# Patient Record
Sex: FEMALE | Race: BLACK OR AFRICAN AMERICAN | Employment: FULL TIME | URBAN - METROPOLITAN AREA
[De-identification: names, ages, dates, MRNs, and addresses within clinical notes are randomized per-mention and may not be internally consistent; named-entity substitution may affect disease eponyms.]

---

## 2021-07-22 ENCOUNTER — HOSPITAL ENCOUNTER (EMERGENCY)
Age: 36
Discharge: HOME | End: 2021-07-22
Attending: EMERGENCY MEDICINE
Payer: COMMERCIAL

## 2021-07-22 ENCOUNTER — APPOINTMENT (OUTPATIENT)
Dept: CT IMAGING | Age: 36
End: 2021-07-22
Payer: COMMERCIAL

## 2021-07-22 ENCOUNTER — APPOINTMENT (OUTPATIENT)
Dept: GENERAL RADIOLOGY | Age: 36
End: 2021-07-22
Payer: COMMERCIAL

## 2021-07-22 VITALS
BODY MASS INDEX: 27.66 KG/M2 | OXYGEN SATURATION: 99 % | RESPIRATION RATE: 18 BRPM | HEART RATE: 68 BPM | TEMPERATURE: 98.1 F | SYSTOLIC BLOOD PRESSURE: 153 MMHG | HEIGHT: 64 IN | DIASTOLIC BLOOD PRESSURE: 97 MMHG | WEIGHT: 162 LBS

## 2021-07-22 DIAGNOSIS — R51.9 ACUTE NONINTRACTABLE HEADACHE, UNSPECIFIED HEADACHE TYPE: ICD-10-CM

## 2021-07-22 DIAGNOSIS — I10 ESSENTIAL HYPERTENSION: Primary | ICD-10-CM

## 2021-07-22 LAB
A/G RATIO: 1.2 (ref 1.1–2.2)
ALBUMIN SERPL-MCNC: 4.1 G/DL (ref 3.4–5)
ALP BLD-CCNC: 65 U/L (ref 40–129)
ALT SERPL-CCNC: 15 U/L (ref 10–40)
ANION GAP SERPL CALCULATED.3IONS-SCNC: 9 MMOL/L (ref 3–16)
AST SERPL-CCNC: 18 U/L (ref 15–37)
BASOPHILS ABSOLUTE: 0.1 K/UL (ref 0–0.2)
BASOPHILS RELATIVE PERCENT: 1.4 %
BILIRUB SERPL-MCNC: <0.2 MG/DL (ref 0–1)
BUN BLDV-MCNC: 10 MG/DL (ref 7–20)
CALCIUM SERPL-MCNC: 9.9 MG/DL (ref 8.3–10.6)
CHLORIDE BLD-SCNC: 99 MMOL/L (ref 99–110)
CO2: 23 MMOL/L (ref 21–32)
CREAT SERPL-MCNC: 0.6 MG/DL (ref 0.6–1.1)
EOSINOPHILS ABSOLUTE: 0.2 K/UL (ref 0–0.6)
EOSINOPHILS RELATIVE PERCENT: 2.7 %
GFR AFRICAN AMERICAN: >60
GFR NON-AFRICAN AMERICAN: >60
GLOBULIN: 3.3 G/DL
GLUCOSE BLD-MCNC: 90 MG/DL (ref 70–99)
HCG QUALITATIVE: NEGATIVE
HCT VFR BLD CALC: 41.6 % (ref 36–48)
HEMOGLOBIN: 13.7 G/DL (ref 12–16)
LYMPHOCYTES ABSOLUTE: 3.3 K/UL (ref 1–5.1)
LYMPHOCYTES RELATIVE PERCENT: 45.8 %
MCH RBC QN AUTO: 27.8 PG (ref 26–34)
MCHC RBC AUTO-ENTMCNC: 32.8 G/DL (ref 31–36)
MCV RBC AUTO: 84.8 FL (ref 80–100)
MONOCYTES ABSOLUTE: 0.5 K/UL (ref 0–1.3)
MONOCYTES RELATIVE PERCENT: 6.2 %
NEUTROPHILS ABSOLUTE: 3.2 K/UL (ref 1.7–7.7)
NEUTROPHILS RELATIVE PERCENT: 43.9 %
PDW BLD-RTO: 13.2 % (ref 12.4–15.4)
PLATELET # BLD: 307 K/UL (ref 135–450)
PMV BLD AUTO: 9 FL (ref 5–10.5)
POTASSIUM REFLEX MAGNESIUM: 4 MMOL/L (ref 3.5–5.1)
RBC # BLD: 4.91 M/UL (ref 4–5.2)
SODIUM BLD-SCNC: 131 MMOL/L (ref 136–145)
TOTAL PROTEIN: 7.4 G/DL (ref 6.4–8.2)
TROPONIN: <0.01 NG/ML
WBC # BLD: 7.3 K/UL (ref 4–11)

## 2021-07-22 PROCEDURE — 93005 ELECTROCARDIOGRAM TRACING: CPT | Performed by: EMERGENCY MEDICINE

## 2021-07-22 PROCEDURE — 85025 COMPLETE CBC W/AUTO DIFF WBC: CPT

## 2021-07-22 PROCEDURE — 99282 EMERGENCY DEPT VISIT SF MDM: CPT

## 2021-07-22 PROCEDURE — 84703 CHORIONIC GONADOTROPIN ASSAY: CPT

## 2021-07-22 PROCEDURE — 71045 X-RAY EXAM CHEST 1 VIEW: CPT

## 2021-07-22 PROCEDURE — 84484 ASSAY OF TROPONIN QUANT: CPT

## 2021-07-22 PROCEDURE — 80053 COMPREHEN METABOLIC PANEL: CPT

## 2021-07-22 PROCEDURE — 70450 CT HEAD/BRAIN W/O DYE: CPT

## 2021-07-22 RX ORDER — ENALAPRIL MALEATE AND HYDROCHLOROTHIAZIDE 10; 25 MG/1; MG/1
1 TABLET ORAL DAILY
Status: ON HOLD | COMMUNITY
End: 2021-08-31 | Stop reason: HOSPADM

## 2021-07-22 ASSESSMENT — PAIN SCALES - GENERAL: PAINLEVEL_OUTOF10: 4

## 2021-07-22 NOTE — ED PROVIDER NOTES
[facility] Emergency Department    CHIEF COMPLAINT  Chief Complaint   Patient presents with    Hypertension     Patient in with complaints of htn and migraine that started this am. States she took her medication and has not had any change. HISTORY OF PRESENT ILLNESS  Andrea Alvarenga is a 38 y.o. female  who presents to the ED complaining of hypertension, history of this, recently restarted on her combination enalapril hydrochlorothiazide medication from her primary care doctor, she notes her blood pressure at home this morning and this afternoon to be in the 150s over 90s which is a little higher than her baseline of the 074X or 955N systolic. She has had some mild lightheadedness and headache. She thought this was typical for her migraine and took Excedrin. She says this is improving although her blood pressure remained high so she comes in for evaluation after being unable to see her PCP. She denies any neck stiffness. No focal numbness tingling or weakness or confusion, no syncope, no chest pain or shortness of breath or any sort of chest discomfort whatsoever. Denies any abdominal pain vomiting or diarrhea. No other complaints, modifying factors or associated symptoms. I have reviewed the following from the nursing documentation. Past Medical History:   Diagnosis Date    Anemia     Taking PO Iron.  GERD (gastroesophageal reflux disease)     Migraines     No meds with pregnancy. History reviewed. No pertinent surgical history.   Family History   Problem Relation Age of Onset    Diabetes Paternal Grandfather      Social History     Socioeconomic History    Marital status: Single     Spouse name: Not on file    Number of children: Not on file    Years of education: Not on file    Highest education level: Not on file   Occupational History    Not on file   Tobacco Use    Smoking status: Never Smoker    Smokeless tobacco: Never Used   Substance and Sexual Activity    Alcohol use: No     Comment: socially/ twice a month    Drug use: No    Sexual activity: Yes     Partners: Male   Other Topics Concern    Not on file   Social History Narrative    Not on file     Social Determinants of Health     Financial Resource Strain:     Difficulty of Paying Living Expenses:    Food Insecurity:     Worried About Running Out of Food in the Last Year:     [address] in the Last Year:    Transportation Needs:     Lack of Transportation (Medical):  Lack of Transportation (Non-Medical):    Physical Activity:     Days of Exercise per Week:     Minutes of Exercise per Session:    Stress:     Feeling of Stress :    Social Connections:     Frequency of Communication with Friends and Family:     Frequency of Social Gatherings with Friends and Family:     Attends Buddhism Services:     Active Member of Clubs or Organizations:     Attends Club or Organization Meetings:     Marital Status:    Intimate Partner Violence:     Fear of Current or Ex-Partner:     Emotionally Abused:     Physically Abused:     Sexually Abused:      No current facility-administered medications for this encounter.      Current Outpatient Medications   Medication Sig Dispense Refill    enalapril-hydroCHLOROthiazide (VASERETIC) 10-25 MG per tablet Take 1 tablet by mouth daily      RaNITidine HCl (ZANTAC PO) Take 1 tablet by mouth daily as needed      vitamin D (CHOLECALCIFEROL) 1000 UNIT TABS tablet Take 1,000 Units by mouth daily      ferrous sulfate 325 (65 FE) MG tablet Take 325 mg by mouth daily (with breakfast)      Prenatal Vit-Fe Fumarate-FA (PRENATAL VITAMIN) 27-0.8 MG TABS Take 1 tablet by mouth daily      ibuprofen (ADVIL;MOTRIN) 800 MG tablet Take 1 tablet by mouth every 6 hours as needed for Pain 120 tablet 3    docusate sodium (COLACE) 100 MG capsule Take 1 capsule by mouth daily as needed for Constipation 60 capsule 1     Allergies   Allergen Reactions    Zofran Hives REVIEW OF SYSTEMS  10 systems reviewed, pertinent positives per HPI otherwise noted to be negative. PHYSICAL EXAM  BP (!) 153/97   Pulse 68   Temp 98.1 °F (36.7 °C) (Oral)   Resp 18   Ht 5' 4\" (1.626 m)   Wt 162 lb (73.5 kg)   SpO2 99%   BMI 27.81 kg/m²    GENERAL APPEARANCE: Awake and alert. Cooperative. No distress. HENT: Normocephalic. Atraumatic. Mucous membranes are moist.  NECK: Supple. EYES: PERRL. EOM's grossly intact. HEART/CHEST: RRR. No murmurs. No chest wall tenderness. LUNGS: Respirations unlabored. CTAB. Good air exchange. Speaking comfortably in full sentences. ABDOMEN: No tenderness. Soft. Non-distended. No masses. No organomegaly. No guarding or rebound. Normal bowel sounds throughout. MUSCULOSKELETAL: No extremity edema. Compartments soft. No deformity. No tenderness in the extremities. All extremities neurovascularly intact. SKIN: Warm and dry. No acute rashes. NEUROLOGICAL: Alert and oriented. CN's 2-12 intact. No gross facial drooping. Strength 5/5, sensation intact. 2 plus DTR's in knees bilaterally. Gait normal.  PSYCHIATRIC: Normal mood and affect. LABS  I have reviewed all labs for this visit.    Results for orders placed or performed during the hospital encounter of 07/22/21   CBC auto differential   Result Value Ref Range    WBC 7.3 4.0 - 11.0 K/uL    RBC 4.91 4.00 - 5.20 M/uL    Hemoglobin 13.7 12.0 - 16.0 g/dL    Hematocrit 41.6 36.0 - 48.0 %    MCV 84.8 80.0 - 100.0 fL    MCH 27.8 26.0 - 34.0 pg    MCHC 32.8 31.0 - 36.0 g/dL    RDW 13.2 12.4 - 15.4 %    Platelets 980 643 - 508 K/uL    MPV 9.0 5.0 - 10.5 fL    Neutrophils % 43.9 %    Lymphocytes % 45.8 %    Monocytes % 6.2 %    Eosinophils % 2.7 %    Basophils % 1.4 %    Neutrophils Absolute 3.2 1.7 - 7.7 K/uL    Lymphocytes Absolute 3.3 1.0 - 5.1 K/uL    Monocytes Absolute 0.5 0.0 - 1.3 K/uL    Eosinophils Absolute 0.2 0.0 - 0.6 K/uL    Basophils Absolute 0.1 0.0 - 0.2 K/uL   Comprehensive Metabolic HISTORY: Reason for exam:->headache HTN Has a \"code stroke\" or \"stroke alert\" been called? ->No Decision Support Exception - unselect if not a suspected or confirmed emergency medical condition->Emergency Medical Condition (MA) Is the patient pregnant?->No Reason for Exam: headache HTN Acuity: Acute Type of Exam: Initial FINDINGS: BRAIN/VENTRICLES: There is no acute intracranial hemorrhage, mass effect or midline shift. No abnormal extra-axial fluid collection. The gray-white differentiation is maintained without evidence of an acute infarct. There is no evidence of hydrocephalus. ORBITS: The visualized portion of the orbits demonstrate no acute abnormality. SINUSES: The visualized paranasal sinuses and mastoid air cells demonstrate no acute abnormality. SOFT TISSUES/SKULL:  No acute abnormality of the visualized skull or soft tissues. No acute intracranial abnormality. XR CHEST PORTABLE    Result Date: 7/22/2021  EXAMINATION: ONE XRAY VIEW OF THE CHEST 7/22/2021 4:57 pm COMPARISON: None. HISTORY: ORDERING SYSTEM PROVIDED HISTORY: HTN TECHNOLOGIST PROVIDED HISTORY: Reason for exam:->HTN Reason for Exam: HTN Acuity: Unknown Type of Exam: Unknown FINDINGS: Heart size is normal.  The lungs are clear. No adenopathy or pleural effusion. No pneumothorax. Mild-to-moderate thoracic scoliosis concave to the left. Normal appearing chest.  No acute abnormality. ED COURSE/MDM  Patient seen and evaluated. Old records reviewed. Labs and imaging reviewed and results discussed with patient. The patient's ED workup was notable for mild headache with hypertension, blood pressures at home or highest at the 587X systolic and she is 156/35 here in triage. She declined analgesics for her headache. I did obtain a head CT considering her history of hypertension with headache and this was negative. She has a nonfocal and reassuring neurologic examination.   She does not have anything to suggest subarachnoid hemorrhage or meningitis. This is a typical migraine type headache for her. She is to continue blood pressure management per primary care discretion. She does not have signs or symptoms of accelerated or malignant hypertension. EKG is normal.  Laboratory assessment is also unremarkable. CLINICAL IMPRESSION  1. Essential hypertension    2. Acute nonintractable headache, unspecified headache type        Blood pressure (!) 153/97, pulse 68, temperature 98.1 °F (36.7 °C), temperature source Oral, resp. rate 18, height 5' 4\" (1.626 m), weight 162 lb (73.5 kg), SpO2 99 %, currently breastfeeding. DISPOSITION  Faisal Haro was discharged to home in stable condition. I have discussed the findings of today's workup with the patient and addressed the patient's questions and concerns. Important warning signs as well as new or worsening symptoms which would necessitate immediate return to the ED were discussed. The plan is to discharge from the ED at this time, and the patient is in stable condition. The patient acknowledged understanding is agreeable with this plan. Follow-up with:  Shawnee Zhou MD  [id number] [address] [place] [address]  [phone]    Schedule an appointment as soon as possible for a visit in 2 days  For symptom re-evaluation    [facility] Emergency Department  [address] [facility]  [phone]  Go to   If symptoms worsen      DISCLAIMER: This chart was created using Dragon dictation software. Efforts were made by me to ensure accuracy, however some errors may be present due to limitations of this technology and occasionally words are not transcribed correctly.         Roshan Singh MD  07/22/21 9867

## 2021-07-23 LAB
EKG ATRIAL RATE: 67 BPM
EKG DIAGNOSIS: NORMAL
EKG P AXIS: 45 DEGREES
EKG P-R INTERVAL: 174 MS
EKG Q-T INTERVAL: 384 MS
EKG QRS DURATION: 74 MS
EKG QTC CALCULATION (BAZETT): 405 MS
EKG R AXIS: 22 DEGREES
EKG T AXIS: 44 DEGREES
EKG VENTRICULAR RATE: 67 BPM

## 2021-07-23 PROCEDURE — 93010 ELECTROCARDIOGRAM REPORT: CPT | Performed by: INTERNAL MEDICINE

## 2021-08-21 LAB — SARS-COV-2: NORMAL

## 2021-08-26 ENCOUNTER — APPOINTMENT (OUTPATIENT)
Dept: GENERAL RADIOLOGY | Age: 36
DRG: 177 | End: 2021-08-26
Payer: COMMERCIAL

## 2021-08-26 ENCOUNTER — HOSPITAL ENCOUNTER (INPATIENT)
Age: 36
LOS: 5 days | Discharge: HOME | DRG: 177 | End: 2021-08-31
Attending: INTERNAL MEDICINE | Admitting: INTERNAL MEDICINE
Payer: COMMERCIAL

## 2021-08-26 DIAGNOSIS — J12.82 PNEUMONIA DUE TO COVID-19 VIRUS: Primary | ICD-10-CM

## 2021-08-26 DIAGNOSIS — J96.01 ACUTE RESPIRATORY FAILURE WITH HYPOXIA (HCC): ICD-10-CM

## 2021-08-26 DIAGNOSIS — U07.1 PNEUMONIA DUE TO COVID-19 VIRUS: Primary | ICD-10-CM

## 2021-08-26 LAB
A/G RATIO: 1.1 (ref 1.1–2.2)
ALBUMIN SERPL-MCNC: 4 G/DL (ref 3.4–5)
ALP BLD-CCNC: 56 U/L (ref 40–129)
ALT SERPL-CCNC: 25 U/L (ref 10–40)
ANION GAP SERPL CALCULATED.3IONS-SCNC: 13 MMOL/L (ref 3–16)
AST SERPL-CCNC: 58 U/L (ref 15–37)
BASOPHILS ABSOLUTE: 0 K/UL (ref 0–0.2)
BASOPHILS RELATIVE PERCENT: 0.6 %
BILIRUB SERPL-MCNC: 0.3 MG/DL (ref 0–1)
BUN BLDV-MCNC: 11 MG/DL (ref 7–20)
CALCIUM SERPL-MCNC: 9.2 MG/DL (ref 8.3–10.6)
CHLORIDE BLD-SCNC: 94 MMOL/L (ref 99–110)
CO2: 25 MMOL/L (ref 21–32)
CREAT SERPL-MCNC: 0.8 MG/DL (ref 0.6–1.1)
D DIMER: 203 NG/ML DDU (ref 0–229)
EOSINOPHILS ABSOLUTE: 0 K/UL (ref 0–0.6)
EOSINOPHILS RELATIVE PERCENT: 0 %
GFR AFRICAN AMERICAN: >60
GFR NON-AFRICAN AMERICAN: >60
GLOBULIN: 3.8 G/DL
GLUCOSE BLD-MCNC: 105 MG/DL (ref 70–99)
HCG QUALITATIVE: NEGATIVE
HCT VFR BLD CALC: 40.6 % (ref 36–48)
HEMOGLOBIN: 13.5 G/DL (ref 12–16)
LACTIC ACID, SEPSIS: 1.1 MMOL/L (ref 0.4–1.9)
LYMPHOCYTES ABSOLUTE: 1.5 K/UL (ref 1–5.1)
LYMPHOCYTES RELATIVE PERCENT: 33.2 %
MCH RBC QN AUTO: 27.1 PG (ref 26–34)
MCHC RBC AUTO-ENTMCNC: 33.2 G/DL (ref 31–36)
MCV RBC AUTO: 81.8 FL (ref 80–100)
MONOCYTES ABSOLUTE: 0.6 K/UL (ref 0–1.3)
MONOCYTES RELATIVE PERCENT: 13.9 %
NEUTROPHILS ABSOLUTE: 2.4 K/UL (ref 1.7–7.7)
NEUTROPHILS RELATIVE PERCENT: 52.3 %
PDW BLD-RTO: 13 % (ref 12.4–15.4)
PLATELET # BLD: 198 K/UL (ref 135–450)
PMV BLD AUTO: 8.9 FL (ref 5–10.5)
POTASSIUM SERPL-SCNC: 3.8 MMOL/L (ref 3.5–5.1)
PRO-BNP: 7 PG/ML (ref 0–124)
PROCALCITONIN: 0.15 NG/ML (ref 0–0.15)
RBC # BLD: 4.97 M/UL (ref 4–5.2)
SODIUM BLD-SCNC: 132 MMOL/L (ref 136–145)
TOTAL PROTEIN: 7.8 G/DL (ref 6.4–8.2)
TROPONIN: <0.01 NG/ML
WBC # BLD: 4.5 K/UL (ref 4–11)

## 2021-08-26 PROCEDURE — 80053 COMPREHEN METABOLIC PANEL: CPT

## 2021-08-26 PROCEDURE — 84703 CHORIONIC GONADOTROPIN ASSAY: CPT

## 2021-08-26 PROCEDURE — 36415 COLL VENOUS BLD VENIPUNCTURE: CPT

## 2021-08-26 PROCEDURE — 99284 EMERGENCY DEPT VISIT MOD MDM: CPT

## 2021-08-26 PROCEDURE — 87040 BLOOD CULTURE FOR BACTERIA: CPT

## 2021-08-26 PROCEDURE — 93005 ELECTROCARDIOGRAM TRACING: CPT | Performed by: NURSE PRACTITIONER

## 2021-08-26 PROCEDURE — 6360000002 HC RX W HCPCS: Performed by: NURSE PRACTITIONER

## 2021-08-26 PROCEDURE — 6370000000 HC RX 637 (ALT 250 FOR IP): Performed by: INTERNAL MEDICINE

## 2021-08-26 PROCEDURE — 85025 COMPLETE CBC W/AUTO DIFF WBC: CPT

## 2021-08-26 PROCEDURE — 71045 X-RAY EXAM CHEST 1 VIEW: CPT

## 2021-08-26 PROCEDURE — 83930 ASSAY OF BLOOD OSMOLALITY: CPT

## 2021-08-26 PROCEDURE — 2580000003 HC RX 258: Performed by: NURSE PRACTITIONER

## 2021-08-26 PROCEDURE — 85379 FIBRIN DEGRADATION QUANT: CPT

## 2021-08-26 PROCEDURE — 83880 ASSAY OF NATRIURETIC PEPTIDE: CPT

## 2021-08-26 PROCEDURE — 83605 ASSAY OF LACTIC ACID: CPT

## 2021-08-26 PROCEDURE — 1200000000 HC SEMI PRIVATE

## 2021-08-26 PROCEDURE — 84145 PROCALCITONIN (PCT): CPT

## 2021-08-26 PROCEDURE — 84484 ASSAY OF TROPONIN QUANT: CPT

## 2021-08-26 RX ORDER — DEXAMETHASONE SODIUM PHOSPHATE 10 MG/ML
6 INJECTION, SOLUTION INTRAMUSCULAR; INTRAVENOUS ONCE
Status: COMPLETED | OUTPATIENT
Start: 2021-08-26 | End: 2021-08-26

## 2021-08-26 RX ORDER — 0.9 % SODIUM CHLORIDE 0.9 %
1000 INTRAVENOUS SOLUTION INTRAVENOUS ONCE
Status: COMPLETED | OUTPATIENT
Start: 2021-08-26 | End: 2021-08-26

## 2021-08-26 RX ORDER — ALBUTEROL SULFATE 90 UG/1
2 AEROSOL, METERED RESPIRATORY (INHALATION) EVERY 6 HOURS PRN
Status: DISCONTINUED | OUTPATIENT
Start: 2021-08-26 | End: 2021-08-31 | Stop reason: HOSPADM

## 2021-08-26 RX ADMIN — DEXAMETHASONE SODIUM PHOSPHATE 6 MG: 10 INJECTION, SOLUTION INTRAMUSCULAR; INTRAVENOUS at 23:22

## 2021-08-26 RX ADMIN — SODIUM CHLORIDE 1000 ML: 9 INJECTION, SOLUTION INTRAVENOUS at 21:24

## 2021-08-26 ASSESSMENT — PAIN SCALES - GENERAL: PAINLEVEL_OUTOF10: 7

## 2021-08-26 ASSESSMENT — ENCOUNTER SYMPTOMS
CHEST TIGHTNESS: 1
VOMITING: 0
NAUSEA: 1
SHORTNESS OF BREATH: 1
DIARRHEA: 0
ABDOMINAL PAIN: 0
COUGH: 1

## 2021-08-26 ASSESSMENT — PAIN DESCRIPTION - LOCATION: LOCATION: GENERALIZED

## 2021-08-26 ASSESSMENT — PAIN DESCRIPTION - PAIN TYPE: TYPE: ACUTE PAIN

## 2021-08-26 NOTE — ED PROVIDER NOTES
are negative. Positives and Pertinent negatives as per HPI. Except as noted above in the ROS, all other systems were reviewed and negative. PAST MEDICAL HISTORY     Past Medical History:   Diagnosis Date    Anemia     Taking PO Iron.  GERD (gastroesophageal reflux disease)     Migraines     No meds with pregnancy. SURGICAL HISTORY   History reviewed. No pertinent surgical history.       CURRENTMEDICATIONS       Previous Medications    DOCUSATE SODIUM (COLACE) 100 MG CAPSULE    Take 1 capsule by mouth daily as needed for Constipation    ENALAPRIL-HYDROCHLOROTHIAZIDE (VASERETIC) 10-25 MG PER TABLET    Take 1 tablet by mouth daily    FERROUS SULFATE 325 (65 FE) MG TABLET    Take 325 mg by mouth daily (with breakfast)    IBUPROFEN (ADVIL;MOTRIN) 800 MG TABLET    Take 1 tablet by mouth every 6 hours as needed for Pain    PRENATAL VIT-FE FUMARATE-FA (PRENATAL VITAMIN) 27-0.8 MG TABS    Take 1 tablet by mouth daily    RANITIDINE HCL (ZANTAC PO)    Take 1 tablet by mouth daily as needed    VITAMIN D (CHOLECALCIFEROL) 1000 UNIT TABS TABLET    Take 1,000 Units by mouth daily         ALLERGIES     Zofran    FAMILYHISTORY       Family History   Problem Relation Age of Onset    Diabetes Paternal Grandfather           SOCIAL HISTORY       Social History     Tobacco Use    Smoking status: Never Smoker    Smokeless tobacco: Never Used   Vaping Use    Vaping Use: Never used   Substance Use Topics    Alcohol use: No     Comment: socially/ twice a month    Drug use: No       SCREENINGS    Beth[place] Coma Scale  Eye Opening: Spontaneous  Best Verbal Response: Oriented  Best Motor Response: Obeys commands  Bethany Coma Scale Score: 15        PHYSICAL EXAM    (up to 7 for level 4, 8 or more for level 5)     ED Triage Vitals [08/26/21 1918]   BP Temp Temp Source Pulse Resp SpO2 Height Weight   115/75 97.5 °F (36.4 °C) Infrared 105 19 99 % 5' 4\" (1.626 m) 153 lb (69.4 kg)       Physical Exam  Vitals and nursing note reviewed. Constitutional:       Appearance: She is well-developed. She is not diaphoretic. HENT:      Head: Normocephalic and atraumatic. Right Ear: External ear normal.      Left Ear: External ear normal.   Eyes:      General:         Right eye: No discharge. Left eye: No discharge. Neck:      Vascular: No JVD. Cardiovascular:      Rate and Rhythm: Tachycardia present. Pulses: Normal pulses. Heart sounds: Normal heart sounds. Pulmonary:      Effort: Pulmonary effort is normal. No respiratory distress. Breath sounds: Rhonchi present. Abdominal:      Palpations: Abdomen is soft. Tenderness: There is no abdominal tenderness. Hernia: No hernia is present. Musculoskeletal:         General: Normal range of motion. Cervical back: Normal range of motion and neck supple. Skin:     General: Skin is warm and dry. Coloration: Skin is not pale. Neurological:      Mental Status: She is alert and oriented to person, place, and time.    Psychiatric:         Behavior: Behavior normal.         DIAGNOSTIC RESULTS   LABS:    Labs Reviewed   COMPREHENSIVE METABOLIC PANEL - Abnormal; Notable for the following components:       Result Value    Sodium 132 (*)     Chloride 94 (*)     Glucose 105 (*)     AST 58 (*)     All other components within normal limits    Narrative:     Performed at:  [facility]  [address] [facility], [address]   Phone [phone]   CULTURE, BLOOD 1   CULTURE, BLOOD 2   CBC WITH AUTO DIFFERENTIAL    Narrative:     Performed at:  [facility]  [address] [facility], [address]   Phone [phone]   LACTATE, SEPSIS    Narrative:     Performed at:  [facility]  [address] [facility], [address]   Phone [phone]   PROCALCITONIN    Narrative:     Performed at:  [facility]  [address] [facility] [address], [place]   Phone [phone]   D-DIMER, QUANTITATIVE    Narrative:     Performed at:  [facility]  [address], [place] [address]   Phone [phone]   TROPONIN    Narrative:     Performed at:  [facility]  [address], [place] [address]   Phone [phone]   BRAIN NATRIURETIC PEPTIDE    Narrative:     Performed at:  [facility]  [address], [place] [address]   Phone [phone]   HCG, SERUM, QUALITATIVE    Narrative:     Performed at:  [facility]  [address], [place]   Phone [phone]   OSMOLALITY   OSMOLALITY, URINE   URINALYSIS   SODIUM, URINE, RANDOM       When ordered only abnormal lab results are displayed. All other labs were within normal range or not returned as of this dictation. EKG: When ordered, EKG's are interpreted by the Emergency Department Physician in the absence of a cardiologist.  Please see their note for interpretation of EKG. RADIOLOGY:   Non-plain film images such as CT, Ultrasound and MRI are read by the radiologist. Plain radiographic images are visualized and preliminarily interpreted by the ED Provider with the below findings:        Interpretation per the Radiologist below, if available at the time of this note:    XR CHEST PORTABLE   Final Result   Patchy infiltrates seen within the mid to lower lungs bilaterally, most   compatible with multifocal pneumonia. Consider both typical and atypical   etiologies, including viral pneumonia. No results found.         PROCEDURES   Unless otherwise noted below, none     Procedures    CRITICAL CARE TIME   N/A    CONSULTS:  IP CONSULT TO HOSPITALIST      EMERGENCY DEPARTMENT COURSE and DIFFERENTIAL DIAGNOSIS/MDM:   Vitals:    Vitals:    08/26/21 2200 08/26/21 2211 08/26/21 2215 08/26/21 2230   BP:   119/80 115/77   Pulse:    96   Resp: Temp:       TempSrc:       SpO2: 99% (!) 88% 94%    Weight:       Height:           Patient was given the following medications:  Medications   albuterol sulfate  (90 Base) MCG/ACT inhaler 2 puff (has no administration in time range)     And   ipratropium (ATROVENT HFA) 17 MCG/ACT inhaler 2 puff (has no administration in time range)   0.9 % sodium chloride bolus (1,000 mLs IntraVENous New Bag 8/26/21 2124)   dexamethasone (PF) (DECADRON) injection 6 mg (6 mg IntraVENous Given 8/26/21 2322)           Briefly, this is a 39year old female that presents to the emergency department with complaint of cough, fever, headache, body aches, chills, and chest pain. States that she is having difficulty breathing. The patient reports that she is COVID-19 positive. CBC is unremarkable. CMP unremarkable. Lactate is normal.  Procalcitonin 0.15. Dimer 203. Troponin negative. BNP 7. Pregnancy negative. XR CHEST PORTABLE (Final result)  Result time 08/26/21 20:39:39  Final result by Davi Green MD (08/26/21 20:39:39)                Impression:    Patchy infiltrates seen within the mid to lower lungs bilaterally, most   compatible with multifocal pneumonia.  Consider both typical and atypical   etiologies, including viral pneumonia. Patient was hypoxic with activity. Patient will be given decadron 6 mg and admitted to hospitalist services for covid pneumonia and acute respiratory failure with hypoxia. FINAL IMPRESSION      1. Pneumonia due to COVID-19 virus    2. Acute respiratory failure with hypoxia [facility])          DISPOSITION/PLAN   DISPOSITION Admitted 08/26/2021 10:54:47 PM      PATIENT REFERRED TO:  No follow-up provider specified.     DISCHARGE MEDICATIONS:  New Prescriptions    No medications on file       DISCONTINUED MEDICATIONS:  Discontinued Medications    No medications on file              (Please note that portions of this note were completed with a voice recognition program.  Efforts were made to edit the dictations but occasionally words are mis-transcribed.)    CHRISTINE Camacho CNP (electronically signed)            CHRISTINE Camacho CNP  08/26/21 3994

## 2021-08-27 LAB
C-REACTIVE PROTEIN: 29 MG/L (ref 0–5.1)
EKG ATRIAL RATE: 106 BPM
EKG DIAGNOSIS: NORMAL
EKG P AXIS: 42 DEGREES
EKG P-R INTERVAL: 156 MS
EKG Q-T INTERVAL: 310 MS
EKG QRS DURATION: 80 MS
EKG QTC CALCULATION (BAZETT): 411 MS
EKG R AXIS: 1 DEGREES
EKG T AXIS: 26 DEGREES
EKG VENTRICULAR RATE: 106 BPM
OSMOLALITY: 279 MOSM/KG (ref 275–295)

## 2021-08-27 PROCEDURE — 6360000002 HC RX W HCPCS: Performed by: INTERNAL MEDICINE

## 2021-08-27 PROCEDURE — 2580000003 HC RX 258: Performed by: NURSE PRACTITIONER

## 2021-08-27 PROCEDURE — XW033E5 INTRODUCTION OF REMDESIVIR ANTI-INFECTIVE INTO PERIPHERAL VEIN, PERCUTANEOUS APPROACH, NEW TECHNOLOGY GROUP 5: ICD-10-PCS | Performed by: INTERNAL MEDICINE

## 2021-08-27 PROCEDURE — 36415 COLL VENOUS BLD VENIPUNCTURE: CPT

## 2021-08-27 PROCEDURE — 2500000003 HC RX 250 WO HCPCS: Performed by: NURSE PRACTITIONER

## 2021-08-27 PROCEDURE — 6370000000 HC RX 637 (ALT 250 FOR IP): Performed by: PHYSICIAN ASSISTANT

## 2021-08-27 PROCEDURE — 86140 C-REACTIVE PROTEIN: CPT

## 2021-08-27 PROCEDURE — 6360000002 HC RX W HCPCS: Performed by: NURSE PRACTITIONER

## 2021-08-27 PROCEDURE — 1200000000 HC SEMI PRIVATE

## 2021-08-27 PROCEDURE — 94761 N-INVAS EAR/PLS OXIMETRY MLT: CPT

## 2021-08-27 PROCEDURE — 2580000003 HC RX 258: Performed by: INTERNAL MEDICINE

## 2021-08-27 PROCEDURE — 93010 ELECTROCARDIOGRAM REPORT: CPT | Performed by: INTERNAL MEDICINE

## 2021-08-27 PROCEDURE — 87040 BLOOD CULTURE FOR BACTERIA: CPT

## 2021-08-27 PROCEDURE — 2700000000 HC OXYGEN THERAPY PER DAY

## 2021-08-27 PROCEDURE — 6370000000 HC RX 637 (ALT 250 FOR IP): Performed by: NURSE PRACTITIONER

## 2021-08-27 PROCEDURE — 94640 AIRWAY INHALATION TREATMENT: CPT

## 2021-08-27 PROCEDURE — 6370000000 HC RX 637 (ALT 250 FOR IP): Performed by: INTERNAL MEDICINE

## 2021-08-27 RX ORDER — PANTOPRAZOLE SODIUM 40 MG/1
40 TABLET, DELAYED RELEASE ORAL
Status: DISCONTINUED | OUTPATIENT
Start: 2021-08-28 | End: 2021-08-27

## 2021-08-27 RX ORDER — FERROUS SULFATE 325(65) MG
325 TABLET ORAL
Status: DISCONTINUED | OUTPATIENT
Start: 2021-08-27 | End: 2021-08-31 | Stop reason: HOSPADM

## 2021-08-27 RX ORDER — DEXAMETHASONE SODIUM PHOSPHATE 10 MG/ML
6 INJECTION, SOLUTION INTRAMUSCULAR; INTRAVENOUS DAILY
Status: DISCONTINUED | OUTPATIENT
Start: 2021-08-27 | End: 2021-08-31

## 2021-08-27 RX ORDER — ONDANSETRON 2 MG/ML
4 INJECTION INTRAMUSCULAR; INTRAVENOUS EVERY 6 HOURS PRN
Status: DISCONTINUED | OUTPATIENT
Start: 2021-08-27 | End: 2021-08-27

## 2021-08-27 RX ORDER — ACETAMINOPHEN 650 MG/1
650 SUPPOSITORY RECTAL EVERY 6 HOURS PRN
Status: DISCONTINUED | OUTPATIENT
Start: 2021-08-27 | End: 2021-08-27 | Stop reason: SDUPTHER

## 2021-08-27 RX ORDER — SODIUM CHLORIDE 9 MG/ML
25 INJECTION, SOLUTION INTRAVENOUS PRN
Status: DISCONTINUED | OUTPATIENT
Start: 2021-08-27 | End: 2021-08-31 | Stop reason: HOSPADM

## 2021-08-27 RX ORDER — VITAMIN B COMPLEX
2000 TABLET ORAL DAILY
Status: DISCONTINUED | OUTPATIENT
Start: 2021-08-27 | End: 2021-08-31 | Stop reason: HOSPADM

## 2021-08-27 RX ORDER — CALCIUM CARBONATE 200(500)MG
500 TABLET,CHEWABLE ORAL 3 TIMES DAILY PRN
Status: DISCONTINUED | OUTPATIENT
Start: 2021-08-27 | End: 2021-08-31 | Stop reason: HOSPADM

## 2021-08-27 RX ORDER — ENALAPRIL MALEATE 5 MG/1
5 TABLET ORAL DAILY
Status: DISCONTINUED | OUTPATIENT
Start: 2021-08-27 | End: 2021-08-31 | Stop reason: HOSPADM

## 2021-08-27 RX ORDER — SODIUM CHLORIDE 0.9 % (FLUSH) 0.9 %
10 SYRINGE (ML) INJECTION EVERY 12 HOURS SCHEDULED
Status: DISCONTINUED | OUTPATIENT
Start: 2021-08-27 | End: 2021-08-31 | Stop reason: HOSPADM

## 2021-08-27 RX ORDER — ACETAMINOPHEN 650 MG/1
650 SUPPOSITORY RECTAL EVERY 6 HOURS PRN
Status: DISCONTINUED | OUTPATIENT
Start: 2021-08-27 | End: 2021-08-31 | Stop reason: HOSPADM

## 2021-08-27 RX ORDER — ACETAMINOPHEN 325 MG/1
650 TABLET ORAL EVERY 6 HOURS PRN
Status: DISCONTINUED | OUTPATIENT
Start: 2021-08-27 | End: 2021-08-27 | Stop reason: SDUPTHER

## 2021-08-27 RX ORDER — SODIUM CHLORIDE 0.9 % (FLUSH) 0.9 %
10 SYRINGE (ML) INJECTION PRN
Status: DISCONTINUED | OUTPATIENT
Start: 2021-08-27 | End: 2021-08-31 | Stop reason: HOSPADM

## 2021-08-27 RX ORDER — PANTOPRAZOLE SODIUM 40 MG/1
40 TABLET, DELAYED RELEASE ORAL
Status: DISCONTINUED | OUTPATIENT
Start: 2021-08-27 | End: 2021-08-31 | Stop reason: HOSPADM

## 2021-08-27 RX ORDER — PROMETHAZINE HYDROCHLORIDE 25 MG/1
12.5 TABLET ORAL EVERY 6 HOURS PRN
Status: DISCONTINUED | OUTPATIENT
Start: 2021-08-27 | End: 2021-08-31 | Stop reason: HOSPADM

## 2021-08-27 RX ORDER — POTASSIUM CHLORIDE 7.45 MG/ML
10 INJECTION INTRAVENOUS PRN
Status: DISCONTINUED | OUTPATIENT
Start: 2021-08-27 | End: 2021-08-31 | Stop reason: HOSPADM

## 2021-08-27 RX ORDER — GUAIFENESIN/DEXTROMETHORPHAN 100-10MG/5
5 SYRUP ORAL EVERY 4 HOURS PRN
Status: DISCONTINUED | OUTPATIENT
Start: 2021-08-27 | End: 2021-08-31 | Stop reason: HOSPADM

## 2021-08-27 RX ORDER — ACETAMINOPHEN 325 MG/1
650 TABLET ORAL EVERY 6 HOURS PRN
Status: DISCONTINUED | OUTPATIENT
Start: 2021-08-27 | End: 2021-08-31 | Stop reason: HOSPADM

## 2021-08-27 RX ORDER — MULTIVITAMIN WITH IRON
1 TABLET ORAL DAILY
Status: DISCONTINUED | OUTPATIENT
Start: 2021-08-27 | End: 2021-08-31 | Stop reason: HOSPADM

## 2021-08-27 RX ORDER — IBUPROFEN 200 MG
200 TABLET ORAL EVERY 6 HOURS PRN
Status: ON HOLD | COMMUNITY
End: 2021-08-31 | Stop reason: HOSPADM

## 2021-08-27 RX ORDER — MAGNESIUM SULFATE IN WATER 40 MG/ML
2000 INJECTION, SOLUTION INTRAVENOUS PRN
Status: DISCONTINUED | OUTPATIENT
Start: 2021-08-27 | End: 2021-08-31 | Stop reason: HOSPADM

## 2021-08-27 RX ADMIN — DEXAMETHASONE SODIUM PHOSPHATE 6 MG: 10 INJECTION, SOLUTION INTRAMUSCULAR; INTRAVENOUS at 11:09

## 2021-08-27 RX ADMIN — ANTACID TABLETS 500 MG: 500 TABLET, CHEWABLE ORAL at 22:02

## 2021-08-27 RX ADMIN — ACETAMINOPHEN 650 MG: 325 TABLET ORAL at 01:52

## 2021-08-27 RX ADMIN — Medication 2000 UNITS: at 08:34

## 2021-08-27 RX ADMIN — ACETAMINOPHEN 650 MG: 325 TABLET ORAL at 23:48

## 2021-08-27 RX ADMIN — FERROUS SULFATE TAB 325 MG (65 MG ELEMENTAL FE) 325 MG: 325 (65 FE) TAB at 11:09

## 2021-08-27 RX ADMIN — Medication 2 PUFF: at 23:26

## 2021-08-27 RX ADMIN — ENALAPRIL MALEATE 5 MG: 5 TABLET ORAL at 11:09

## 2021-08-27 RX ADMIN — REMDESIVIR 200 MG: 100 INJECTION, POWDER, LYOPHILIZED, FOR SOLUTION INTRAVENOUS at 11:15

## 2021-08-27 RX ADMIN — PANTOPRAZOLE SODIUM 40 MG: 40 TABLET, DELAYED RELEASE ORAL at 22:02

## 2021-08-27 RX ADMIN — Medication 10 ML: at 20:54

## 2021-08-27 RX ADMIN — THERA TABS 1 TABLET: TAB at 11:09

## 2021-08-27 RX ADMIN — Medication 10 ML: at 08:34

## 2021-08-27 RX ADMIN — ENOXAPARIN SODIUM 30 MG: 30 INJECTION SUBCUTANEOUS at 20:54

## 2021-08-27 RX ADMIN — ENOXAPARIN SODIUM 30 MG: 30 INJECTION SUBCUTANEOUS at 08:34

## 2021-08-27 ASSESSMENT — PAIN SCALES - GENERAL
PAINLEVEL_OUTOF10: 0
PAINLEVEL_OUTOF10: 0
PAINLEVEL_OUTOF10: 2
PAINLEVEL_OUTOF10: 0

## 2021-08-27 NOTE — ED NOTES
Pt ambulated down hallway with portable pulse ox with O2 sat of 95% throughout on RA. After walking, pt became hypoxic of 88% and placed on 2L NC. Pt tolerated well. Will continue to monitor.       Estee Pope RN  08/26/21 2216       Estee Pope RN  08/26/21 0000       Estee Pope RN  08/26/21 2931

## 2021-08-27 NOTE — CARE COORDINATION
Discharge Planning Note:    Patient admitted as Observation/OPIB with an anticipated short hospitalization length of stay. Chart reviewed and it appears that patient has minimal needs for discharge at this time. Low risk of re-hospitalization (8%). Case management will continue to follow progress and update discharge plan as needed.     Casi Cormier RN BSN  Case Management  [phone]

## 2021-08-27 NOTE — H&P
Hospital Medicine History & Physical      PCP: Valentin Light MD    Date of Admission: 8/26/2021    Date of Service: Pt seen/examined on 8/26/2021  Pt seen/examined face to face on and admitted as inpatient with expected LOS greater than two midnights due to medical therapy    Chief Complaint:    Chief Complaint   Patient presents with    Shortness of Breath     Shortness of breath X2 hours. Covid+, tested last Friday. History Of Present Illness:      39 y.o. female healthy functional independent who presented to [facility] with past medical history of GERD, hypertension, migraine presented to the ED worsening shortness of breath. Patient reported she recently had an exposure from her daughter who had tested positive this week and the patient started having Covid symptoms on Friday. Her daughter is 6years old and only had anosmia, the patient however started having worsening cough and shortness of breath body aches fevers chills and headache. Patient reported that they are both not vaccinated. Exacerbated with exertion, no alleviating factor. No history of strokes heart attacks or blood clots in the family as well. CODE STATUS discussed and the patient is a full code      Past Medical History:          Diagnosis Date    Anemia     Taking PO Iron.  GERD (gastroesophageal reflux disease)     Hypertension     Migraines     No meds with pregnancy. Past Surgical History:          Procedure Laterality Date    COLONOSCOPY         Medications Prior to Admission:      Prior to Admission medications    Medication Sig Start Date End Date Taking?  Authorizing Provider   ibuprofen (ADVIL;MOTRIN) 200 MG tablet Take 200 mg by mouth every 6 hours as needed for Pain   Yes Historical Provider, MD   enalapril-hydroCHLOROthiazide (VASERETIC) 10-25 MG per tablet Take 1 tablet by mouth daily   Yes Historical Provider, MD   ibuprofen (ADVIL;MOTRIN) 800 MG tablet Take 1 tablet by mouth every 6 hours as needed for Pain 8/24/16  Yes Fede Tellez MD   RaNITidine HCl (ZANTAC PO) Take 1 tablet by mouth daily as needed    Historical Provider, MD   vitamin D (CHOLECALCIFEROL) 1000 UNIT TABS tablet Take 1,000 Units by mouth daily    Historical Provider, MD   ferrous sulfate 325 (65 FE) MG tablet Take 325 mg by mouth daily (with breakfast)    Historical Provider, MD   Prenatal Vit-Fe Fumarate-FA (PRENATAL VITAMIN) 27-0.8 MG TABS Take 1 tablet by mouth daily    Historical Provider, MD   docusate sodium (COLACE) 100 MG capsule Take 1 capsule by mouth daily as needed for Constipation 8/24/16   Fede Tellez MD       Allergies:  Zofran    Social History:          TOBACCO:   reports that she has never smoked. She has never used smokeless tobacco.  ETOH:   reports no history of alcohol use. E-Cigarettes/Vaping Use     Questions Responses    E-Cigarette/Vaping Use Never User    Start Date     Passive Exposure     Quit Date     Counseling Given     Comments             Family History:      Reviewed in detail         Problem Relation Age of Onset    Diabetes Paternal Grandfather        REVIEW OF SYSTEMS:     Constitutional: + Fever, N + chills, No Night Sweats  ENT/Mouth:  No Nasal Congestion,  No Hoarseness, No new mouth lesion  Eyes:  No Eye Pain, No Redness, No Discharge  Cardiovascular: + Chest Pain, No Orthopnea, No Palpitations  Respiratory: + Cough, No Sputum, + dyspnea  Gastrointestinal: No Vomiting, No Diarrhea, No abdominal pain  Genitourinary: No Urinary Frequency, No Hematuria, No Urinary pain  Musculoskeletal:  No worsening Arthralgias, No worsening Myalgias  Skin:  No new Skin Lesions, No new skin rash  Neuro:  No new weakness, No new numbness.   Psych:  No suicial ideation, No Violence ideation    PHYSICAL EXAM PERFORMED:    /70   Pulse 92   Temp 100.2 °F (37.9 °C) (Oral)   Resp 18   Ht 5' 4\" (1.626 m)   Wt 156 lb (70.8 kg)   LMP 08/01/2021 (Exact Date)   SpO2 99%   Breastfeeding No ED,  Patient currently on 2 L satting high 98s  Wean as tolerated with home O2 evaluation    Hyponatremia:  Etiology unclear at this time  Urine lites pending  Likely secondary to patient being on hydrochlorothiazide    GERD: Continue home medication  Essential Hypertension: Continue home medication    Diet: NPO except meds ordered    DVT Prophylaxis: lovenox    Dispo:   Expected LOS greater than two Ashley 1153, DO

## 2021-08-27 NOTE — PROGRESS NOTES
[facility] PROGRESS NOTE    8/27/2021 8:45 AM        Name: Silvia Lozada . Admitted: 8/26/2021  Primary Care Provider: Stacy Youssef MD (Tel: [phone])     Chief Complaint   Patient presents with    Shortness of Breath     Shortness of breath X2 hours. Covid+, tested last Friday. Brief History: Patient is a 40 yo female with hx JOAQUÍN, HTN. She was diagnosed with COVID-19 about 1 week ago. Presented to hospital with worsening shortness of breath. CXR with evidence of multifocal pneumonia. Documented O2 sat 88% in ER. She was given 1 dose IV decadron. Subjective:  Presently resting in bed. Main complaint remains shortness of breath and cough although breathing not bad at rest. Denies chest pain, chills, nausea, diarrhea.      Reviewed interval ancillary notes    Current Medications  sodium chloride flush 0.9 % injection 10 mL, 2 times per day  sodium chloride flush 0.9 % injection 10 mL, PRN  0.9 % sodium chloride infusion, PRN  potassium chloride 10 mEq/100 mL IVPB (Peripheral Line), PRN  magnesium sulfate 2000 mg in 50 mL IVPB premix, PRN  promethazine (PHENERGAN) tablet 12.5 mg, Q6H PRN  acetaminophen (TYLENOL) tablet 650 mg, Q6H PRN   Or  acetaminophen (TYLENOL) suppository 650 mg, Q6H PRN  enoxaparin (LOVENOX) injection 30 mg, BID  guaiFENesin-dextromethorphan (ROBITUSSIN DM) 100-10 MG/5ML syrup 5 mL, Q4H PRN  Vitamin D (CHOLECALCIFEROL) tablet 2,000 Units, Daily  lip balm petroleum free (PHYTOPLEX) stick, PRN  albuterol sulfate  (90 Base) MCG/ACT inhaler 2 puff, Q6H PRN   And  ipratropium (ATROVENT HFA) 17 MCG/ACT inhaler 2 puff, Q6H PRN        Objective:  /75   Pulse 75   Temp 97.3 °F (36.3 °C) (Oral)   Resp 16   Ht 5' 4\" (1.626 m)   Wt 156 lb (70.8 kg)   LMP 08/01/2021 (Exact Date)   SpO2 96%   Breastfeeding No   BMI 26.78 kg/m²   No intake or output data in the 24 hours ending 08/27/21 0845   Wt Readings from Last 3 Encounters:   08/27/21 156 lb (70.8 kg)   07/22/21 162 lb (73.5 kg)   04/11/12 153 lb (69.4 kg)       General appearance:  Appears comfortable  Eyes: Sclera clear. Pupils equal.  ENT: Moist oral mucosa. Trachea midline, no adenopathy. Cardiovascular: Regular rhythm, normal S1, S2. No murmur. No edema in lower extremities  Respiratory: Not using accessory muscles. Good inspiratory effort. Diminished, nonproductive cough, no wheezes. GI: Abdomen soft, no tenderness, not distended, normal bowel sounds  Musculoskeletal: No cyanosis in digits, neck supple  Neurology: CN 2-12 grossly intact. No speech or motor deficits  Psych: Normal affect. Alert and oriented in time, place and person  Skin: Warm, dry, normal turgor    Labs and Tests:  CBC:   Recent Labs     08/26/21 1946   WBC 4.5   HGB 13.5        BMP:    Recent Labs     08/26/21 1946   *   K 3.8   CL 94*   CO2 25   BUN 11   CREATININE 0.8   GLUCOSE 105*     Hepatic:   Recent Labs     08/26/21 1946   AST 58*   ALT 25   BILITOT 0.3   ALKPHOS 56       CXR 8/26/2021:  Patchy infiltrates seen within the mid to lower lungs bilaterally, most   compatible with multifocal pneumonia.  Consider both typical and atypical   etiologies, including viral pneumonia. Problem List  Active Problems:    Pneumonia due to COVID-19 virus  Resolved Problems:    * No resolved hospital problems. *       Assessment & Plan:   1. COVID-19 pneumonia. Patient unvaccinated. Received + COVID test 1 week ago. CXR consistent with multifocal pneumonia, procalcitonin 0.15. O2 sat 88% in ED. Received 1 dose IV decadron. Since requiring O2, currently at 2 liters, continue decadron 6 mg IV daily. Also add Remdesivir 200 mg once then 100 mg daily. CRP 29, D-dimer 203, on BID enoxaparin, continue to follow. 2. Acute hypoxic respiratory failure. O2 sat documented at 88% in ED. Secondary to multifocal pneumonia.  Wean O2 as tolerated. 3. Hyponatremia. Sodium 132 on presentation. Likely secondary to HCTZ use. HCTZ held on admission. BMP in am.   4. HTN. Controlled. Resume enalapril. Monitor BP. 5. GERD. Resume Protonix 40 mg daily. Diet: ADULT DIET;  Regular  Code:Full Code  DVT PPX: enoxaparin      CHRISTINE Reynolds CNP   8/27/2021 8:45 AM

## 2021-08-27 NOTE — PROGRESS NOTES
Comprehensive Nutrition Assessment    Type and Reason for Visit:  Positive Nutrition Screen (MST 2)    Nutrition Recommendations/Plan:   1. Offer Ensure Enlive BID  2. Please obtain actual weight     Nutrition Assessment:  Pt is at risk for nutrition compromise as evidenced by inadequate appetite and PO intake over the past week d/t not feeling well with Covid. She did not eat breakfast this morning, but reported she had ordered lunch and was feeling hungry. Agreeable to trial Ensure BID for additional nutrition until appetite fully returns. Malnutrition Assessment:  Malnutrition Status:  Insufficient data      Nutrition Related Findings:  No edema noted      Wounds:  None       Current Nutrition Therapies:    ADULT DIET; Regular    Anthropometric Measures:  · Height: 5' 4\" (162.6 cm)  · Current Body Weight: 156 lb (70.8 kg)   · Ideal Body Weight: 120 lbs; % Ideal Body Weight 130 %   · BMI: 26.8  · BMI Categories: Overweight (BMI 25.0-29. 9) (Based on stated weight)       Nutrition Diagnosis:   · Inadequate protein-energy intake related to inadequate protein-energy intake as evidenced by poor intake prior to admission      Nutrition Interventions:   Food and/or Nutrient Delivery:  Continue Current Diet, Start Oral Nutrition Supplement  Nutrition Education/Counseling:  Education not indicated   Coordination of Nutrition Care:  Continue to monitor while inpatient    Goals:  Pt will consume at least 50% of meals and supplements       Nutrition Monitoring and Evaluation:   Behavioral-Environmental Outcomes:  None Identified   Food/Nutrient Intake Outcomes:  Food and Nutrient Intake, Supplement Intake  Physical Signs/Symptoms Outcomes:  None Identified     Discharge Planning:     Too soon to determine     Electronically signed by Barb Cortes RD, LD on 8/27/21 at 3:16 PM EDT    Contact: [phone]

## 2021-08-28 LAB
A/G RATIO: 1.1 (ref 1.1–2.2)
ALBUMIN SERPL-MCNC: 3.5 G/DL (ref 3.4–5)
ALP BLD-CCNC: 50 U/L (ref 40–129)
ALT SERPL-CCNC: 22 U/L (ref 10–40)
ANION GAP SERPL CALCULATED.3IONS-SCNC: 12 MMOL/L (ref 3–16)
AST SERPL-CCNC: 37 U/L (ref 15–37)
BACTERIA: ABNORMAL /HPF
BASOPHILS ABSOLUTE: 0 K/UL (ref 0–0.2)
BASOPHILS RELATIVE PERCENT: 0.1 %
BILIRUB SERPL-MCNC: 0.3 MG/DL (ref 0–1)
BILIRUBIN URINE: NEGATIVE
BLOOD, URINE: ABNORMAL
BUN BLDV-MCNC: 12 MG/DL (ref 7–20)
C-REACTIVE PROTEIN: 19.6 MG/L (ref 0–5.1)
CALCIUM SERPL-MCNC: 8.7 MG/DL (ref 8.3–10.6)
CHLORIDE BLD-SCNC: 102 MMOL/L (ref 99–110)
CLARITY: ABNORMAL
CO2: 24 MMOL/L (ref 21–32)
COLOR: YELLOW
CREAT SERPL-MCNC: 0.6 MG/DL (ref 0.6–1.1)
D DIMER: <200 NG/ML DDU (ref 0–229)
EOSINOPHILS ABSOLUTE: 0 K/UL (ref 0–0.6)
EOSINOPHILS RELATIVE PERCENT: 0 %
EPITHELIAL CELLS, UA: 9 /HPF (ref 0–5)
GFR AFRICAN AMERICAN: >60
GFR NON-AFRICAN AMERICAN: >60
GLOBULIN: 3.2 G/DL
GLUCOSE BLD-MCNC: 112 MG/DL (ref 70–99)
GLUCOSE URINE: NEGATIVE MG/DL
HCT VFR BLD CALC: 37.2 % (ref 36–48)
HEMOGLOBIN: 12 G/DL (ref 12–16)
HYALINE CASTS: 3 /LPF (ref 0–8)
KETONES, URINE: ABNORMAL MG/DL
LEUKOCYTE ESTERASE, URINE: NEGATIVE
LYMPHOCYTES ABSOLUTE: 1.4 K/UL (ref 1–5.1)
LYMPHOCYTES RELATIVE PERCENT: 19.2 %
MCH RBC QN AUTO: 26.7 PG (ref 26–34)
MCHC RBC AUTO-ENTMCNC: 32.3 G/DL (ref 31–36)
MCV RBC AUTO: 82.8 FL (ref 80–100)
MICROSCOPIC EXAMINATION: YES
MONOCYTES ABSOLUTE: 0.8 K/UL (ref 0–1.3)
MONOCYTES RELATIVE PERCENT: 11.5 %
NEUTROPHILS ABSOLUTE: 5.1 K/UL (ref 1.7–7.7)
NEUTROPHILS RELATIVE PERCENT: 69.2 %
NITRITE, URINE: NEGATIVE
OSMOLALITY URINE: 388 MOSM/KG (ref 390–1070)
PDW BLD-RTO: 13.2 % (ref 12.4–15.4)
PH UA: 7 (ref 5–8)
PLATELET # BLD: 235 K/UL (ref 135–450)
PMV BLD AUTO: 8.2 FL (ref 5–10.5)
POTASSIUM REFLEX MAGNESIUM: 3.8 MMOL/L (ref 3.5–5.1)
PROTEIN UA: ABNORMAL MG/DL
RBC # BLD: 4.49 M/UL (ref 4–5.2)
RBC UA: ABNORMAL /HPF (ref 0–4)
SODIUM BLD-SCNC: 138 MMOL/L (ref 136–145)
SODIUM URINE: <20 MMOL/L
SPECIFIC GRAVITY UA: 1.01 (ref 1–1.03)
TOTAL PROTEIN: 6.7 G/DL (ref 6.4–8.2)
URINE TYPE: ABNORMAL
UROBILINOGEN, URINE: 1 E.U./DL
WBC # BLD: 7.4 K/UL (ref 4–11)
WBC UA: 9 /HPF (ref 0–5)

## 2021-08-28 PROCEDURE — 2580000003 HC RX 258: Performed by: INTERNAL MEDICINE

## 2021-08-28 PROCEDURE — 86140 C-REACTIVE PROTEIN: CPT

## 2021-08-28 PROCEDURE — 94680 O2 UPTK RST&XERS DIR SIMPLE: CPT

## 2021-08-28 PROCEDURE — 6370000000 HC RX 637 (ALT 250 FOR IP): Performed by: NURSE PRACTITIONER

## 2021-08-28 PROCEDURE — 6360000002 HC RX W HCPCS: Performed by: NURSE PRACTITIONER

## 2021-08-28 PROCEDURE — 1200000000 HC SEMI PRIVATE

## 2021-08-28 PROCEDURE — 81003 URINALYSIS AUTO W/O SCOPE: CPT

## 2021-08-28 PROCEDURE — 6370000000 HC RX 637 (ALT 250 FOR IP): Performed by: INTERNAL MEDICINE

## 2021-08-28 PROCEDURE — 80053 COMPREHEN METABOLIC PANEL: CPT

## 2021-08-28 PROCEDURE — 6360000002 HC RX W HCPCS: Performed by: INTERNAL MEDICINE

## 2021-08-28 PROCEDURE — 84300 ASSAY OF URINE SODIUM: CPT

## 2021-08-28 PROCEDURE — 36415 COLL VENOUS BLD VENIPUNCTURE: CPT

## 2021-08-28 PROCEDURE — 85025 COMPLETE CBC W/AUTO DIFF WBC: CPT

## 2021-08-28 PROCEDURE — 2500000003 HC RX 250 WO HCPCS: Performed by: NURSE PRACTITIONER

## 2021-08-28 PROCEDURE — 6370000000 HC RX 637 (ALT 250 FOR IP): Performed by: PHYSICIAN ASSISTANT

## 2021-08-28 PROCEDURE — 2580000003 HC RX 258: Performed by: NURSE PRACTITIONER

## 2021-08-28 PROCEDURE — 85379 FIBRIN DEGRADATION QUANT: CPT

## 2021-08-28 PROCEDURE — 83935 ASSAY OF URINE OSMOLALITY: CPT

## 2021-08-28 RX ADMIN — PANTOPRAZOLE SODIUM 40 MG: 40 TABLET, DELAYED RELEASE ORAL at 09:09

## 2021-08-28 RX ADMIN — THERA TABS 1 TABLET: TAB at 09:09

## 2021-08-28 RX ADMIN — Medication 2000 UNITS: at 09:09

## 2021-08-28 RX ADMIN — FERROUS SULFATE TAB 325 MG (65 MG ELEMENTAL FE) 325 MG: 325 (65 FE) TAB at 09:09

## 2021-08-28 RX ADMIN — DEXAMETHASONE SODIUM PHOSPHATE 6 MG: 10 INJECTION, SOLUTION INTRAMUSCULAR; INTRAVENOUS at 09:09

## 2021-08-28 RX ADMIN — ENOXAPARIN SODIUM 30 MG: 30 INJECTION SUBCUTANEOUS at 09:09

## 2021-08-28 RX ADMIN — Medication 10 ML: at 09:10

## 2021-08-28 RX ADMIN — ENOXAPARIN SODIUM 30 MG: 30 INJECTION SUBCUTANEOUS at 21:24

## 2021-08-28 RX ADMIN — Medication 10 ML: at 21:24

## 2021-08-28 RX ADMIN — REMDESIVIR 100 MG: 100 INJECTION, POWDER, LYOPHILIZED, FOR SOLUTION INTRAVENOUS at 09:59

## 2021-08-28 ASSESSMENT — PAIN SCALES - GENERAL
PAINLEVEL_OUTOF10: 0

## 2021-08-28 NOTE — PROGRESS NOTES
[facility] PROGRESS NOTE    8/28/2021 4:42 PM        Name: Oly Carr . Admitted: 8/26/2021  Primary Care Provider: Gricelda Santana MD (Tel: [phone])     Chief Complaint   Patient presents with    Shortness of Breath     Shortness of breath X2 hours. Covid+, tested last Friday. Brief History: Patient is a 38 yo female with hx JOAQUÍN, HTN. She was diagnosed with COVID-19 about 1 week ago. Presented to hospital with worsening shortness of breath. CXR with evidence of multifocal pneumonia. Documented O2 sat 88% in ER. She was given 1 dose IV decadron. Subjective:  Patient seen earlier this afternoon Presently resting in bed. Reports she is feeling better and would like to be discharged today. Still with shortness of breath ambulating to bathroom. Nursing reports O2 sat drops down to 90% with ambulation in room. Still with frequent, nonproductive cough. Temp max 99.4 past 24 hours. Denies nausea or diarrhea. Has received 2 doses remdesivir. Remains on IV decadron. O2 sats mid 90s room air.       Reviewed interval ancillary notes    Current Medications  sodium chloride flush 0.9 % injection 10 mL, 2 times per day  sodium chloride flush 0.9 % injection 10 mL, PRN  0.9 % sodium chloride infusion, PRN  potassium chloride 10 mEq/100 mL IVPB (Peripheral Line), PRN  magnesium sulfate 2000 mg in 50 mL IVPB premix, PRN  promethazine (PHENERGAN) tablet 12.5 mg, Q6H PRN  acetaminophen (TYLENOL) tablet 650 mg, Q6H PRN   Or  acetaminophen (TYLENOL) suppository 650 mg, Q6H PRN  enoxaparin (LOVENOX) injection 30 mg, BID  guaiFENesin-dextromethorphan (ROBITUSSIN DM) 100-10 MG/5ML syrup 5 mL, Q4H PRN  Vitamin D (CHOLECALCIFEROL) tablet 2,000 Units, Daily  lip balm petroleum free (PHYTOPLEX) stick, PRN  dexamethasone (PF) (DECADRON) injection 6 mg, Daily  remdesivir 100 mg in sodium chloride 0.9 % 250 mL IVPB, Q24H  enalapril (VASOTEC) tablet 5 mg, Daily  ferrous sulfate (IRON 325) tablet 325 mg, Daily with breakfast  multivitamin 1 tablet, Daily  pantoprazole (PROTONIX) tablet 40 mg, QAM AC  calcium carbonate (TUMS) chewable tablet 500 mg, TID PRN  albuterol sulfate  (90 Base) MCG/ACT inhaler 2 puff, Q6H PRN   And  ipratropium (ATROVENT HFA) 17 MCG/ACT inhaler 2 puff, Q6H PRN        Objective:  /77   Pulse 74   Temp 98.8 °F (37.1 °C) (Oral)   Resp 18   Ht 5' 4\" (1.626 m)   Wt 156 lb (70.8 kg)   LMP 08/01/2021 (Exact Date)   SpO2 93%   Breastfeeding No   BMI 26.78 kg/m²   No intake or output data in the 24 hours ending 08/28/21 1642   Wt Readings from Last 3 Encounters:   08/27/21 156 lb (70.8 kg)   07/22/21 162 lb (73.5 kg)   04/11/12 153 lb (69.4 kg)     General:  Awake, alert, oriented in NAD  Skin:  Warm and dry. No unusual bruising or rash  Neck:  Supple. No JVD or carotid bruit appreciated  Chest:  Normal effort. Diminished, nonproductive cough, no wheezes/rhonchi/rales  Cardiovascular:  RRR, normal S1/S2, no murmur/gallop/rub  Abdomen:  Soft, nontender, +bowel sounds  Extremities:  No edema  Neurological: No focal deficits  Psychological: Normal mood and affect      Labs and Tests:  CBC:   Recent Labs     08/26/21 1946 08/28/21  0639   WBC 4.5 7.4   HGB 13.5 12.0    235     BMP:    Recent Labs     08/26/21 1946 08/28/21  0639   * 138   K 3.8 3.8   CL 94* 102   CO2 25 24   BUN 11 12   CREATININE 0.8 0.6   GLUCOSE 105* 112*     Hepatic:   Recent Labs     08/26/21 1946 08/28/21  0639   AST 58* 37   ALT 25 22   BILITOT 0.3 0.3   ALKPHOS 56 50       CXR 8/26/2021:  Patchy infiltrates seen within the mid to lower lungs bilaterally, most   compatible with multifocal pneumonia.  Consider both typical and atypical   etiologies, including viral pneumonia. Problem List  Active Problems:    Pneumonia due to COVID-19 virus  Resolved Problems:    * No resolved hospital problems.  * Assessment & Plan:   1. COVID-19 pneumonia. Patient unvaccinated. Received + COVID test 1 week ago. CXR consistent with multifocal pneumonia, procalcitonin 0.15. O2 sat 88% in ED. Started on IV decadron 6 mg and Remdesivir (day 2). CRP 29->19, D-dimer 203-> less than 200. Continue BID enoxaparin. 2. Acute hypoxic respiratory failure. O2 sat documented at 88% in ED. Secondary to multifocal pneumonia. O2 has been weaned, stable mid 90s on room air. Check for home O2.   3. Hyponatremia. Sodium 132 on presentation. Likely secondary to HCTZ use. HCTZ held on admission. Improved, sodium 138 today. 4. HTN. Controlled. Continue enalapril. Monitor BP. 5. GERD. Resume Protonix 40 mg daily. Patient feeling better although still with cough and exertional shortness of breath. O2 has been weaned, sats stable on room air. Discussed possible Dc to home. Will assess for home O2. If she continues to desat with ambulation, recommend she remain in hospital for further remdesivir doses, she has received 2 so far. Patient in agreement. Respiratory therapy evaluated for home O2. Patient desats to 85% on room air with exertion. Will hold DC today to continue antiviral therapy. Diet: ADULT DIET;  Regular  Adult Oral Nutrition Supplement; Standard High Calorie/High Protein Oral Supplement  Code:Full Code  DVT PPX: enoxaparin      CHRISTINE Weaver - CNP   8/28/2021 4:42 PM

## 2021-08-28 NOTE — PLAN OF CARE
Problem: Isolation Precautions - Risk of Spread of Infection  Goal: Prevent transmission of infection  Outcome: Ongoing     Problem: Airway Clearance - Ineffective  Goal: Achieve or maintain patent airway  Outcome: Ongoing     Problem: Gas Exchange - Impaired  Goal: Absence of hypoxia  Outcome: Ongoing  Goal: Promote optimal lung function  Outcome: Ongoing     Problem: Breathing Pattern - Ineffective  Goal: Ability to achieve and maintain a regular respiratory rate  Outcome: Ongoing     Problem:  Body Temperature -  Risk of, Imbalanced  Goal: Ability to maintain a body temperature within defined limits  Outcome: Ongoing  Goal: Will regain or maintain usual level of consciousness  Outcome: Ongoing  Goal: Complications related to the disease process, condition or treatment will be avoided or minimized  Outcome: Ongoing     Problem: Nutrition Deficits  Goal: Optimize nutritional status  Outcome: Ongoing     Problem: Risk for Fluid Volume Deficit  Goal: Maintain normal heart rhythm  Outcome: Ongoing  Goal: Maintain absence of muscle cramping  Outcome: Ongoing  Goal: Maintain normal serum potassium, sodium, calcium, phosphorus, and pH  Outcome: Ongoing     Problem: Loneliness or Risk for Loneliness  Goal: Demonstrate positive use of time alone when socialization is not possible  Outcome: Ongoing     Problem: Fatigue  Goal: Verbalize increase energy and improved vitality  Outcome: Ongoing     Problem: Patient Education: Go to Patient Education Activity  Goal: Patient/Family Education  Outcome: Ongoing     Problem: Nutrition  Goal: Optimal nutrition therapy  Outcome: Ongoing     Problem: Falls - Risk of:  Goal: Will remain free from falls  Description: Will remain free from falls  Outcome: Ongoing  Goal: Absence of physical injury  Description: Absence of physical injury  Outcome: Ongoing

## 2021-08-28 NOTE — PROGRESS NOTES
Home Oxygen Evaluation          O2 sat on room air at rest =92%       O2 sat on room air with exertion = 85%       O2 sat on 1LPM oxygen with exertion = 88%       O2 sat on 2LPM oxygen with exertion = 93%

## 2021-08-28 NOTE — PROGRESS NOTES
Shift assessment complete. VSS. Scheduled medications given. PRN tums given for heartburn. Call light within reach. Pt. denies further needs at this time.

## 2021-08-28 NOTE — PROGRESS NOTES
Pt. called out complaining of SOB and wheezing. 02 95% on RA. Pt. was placed on 2L 02 for comfort and RN called respiratory. PRN inhaler given by respiratory. Pt. reassessed and states that she is feeling better but still SOB and would like to stay on 02 for now for comfort. Pt. 97-99% on 2L 02.

## 2021-08-29 LAB
A/G RATIO: 1 (ref 1.1–2.2)
ALBUMIN SERPL-MCNC: 3.4 G/DL (ref 3.4–5)
ALP BLD-CCNC: 48 U/L (ref 40–129)
ALT SERPL-CCNC: 21 U/L (ref 10–40)
ANION GAP SERPL CALCULATED.3IONS-SCNC: 10 MMOL/L (ref 3–16)
AST SERPL-CCNC: 32 U/L (ref 15–37)
BASOPHILS ABSOLUTE: 0 K/UL (ref 0–0.2)
BASOPHILS RELATIVE PERCENT: 0.3 %
BILIRUB SERPL-MCNC: <0.2 MG/DL (ref 0–1)
BUN BLDV-MCNC: 11 MG/DL (ref 7–20)
C-REACTIVE PROTEIN: 13 MG/L (ref 0–5.1)
CALCIUM SERPL-MCNC: 8.7 MG/DL (ref 8.3–10.6)
CHLORIDE BLD-SCNC: 105 MMOL/L (ref 99–110)
CO2: 24 MMOL/L (ref 21–32)
CREAT SERPL-MCNC: 0.6 MG/DL (ref 0.6–1.1)
D DIMER: <200 NG/ML DDU (ref 0–229)
EOSINOPHILS ABSOLUTE: 0 K/UL (ref 0–0.6)
EOSINOPHILS RELATIVE PERCENT: 0 %
GFR AFRICAN AMERICAN: >60
GFR NON-AFRICAN AMERICAN: >60
GLOBULIN: 3.5 G/DL
GLUCOSE BLD-MCNC: 102 MG/DL (ref 70–99)
HCT VFR BLD CALC: 37 % (ref 36–48)
HEMOGLOBIN: 12 G/DL (ref 12–16)
LYMPHOCYTES ABSOLUTE: 1.3 K/UL (ref 1–5.1)
LYMPHOCYTES RELATIVE PERCENT: 25.2 %
MCH RBC QN AUTO: 27.2 PG (ref 26–34)
MCHC RBC AUTO-ENTMCNC: 32.5 G/DL (ref 31–36)
MCV RBC AUTO: 83.6 FL (ref 80–100)
MONOCYTES ABSOLUTE: 0.9 K/UL (ref 0–1.3)
MONOCYTES RELATIVE PERCENT: 17.1 %
NEUTROPHILS ABSOLUTE: 2.9 K/UL (ref 1.7–7.7)
NEUTROPHILS RELATIVE PERCENT: 57.4 %
PDW BLD-RTO: 13.3 % (ref 12.4–15.4)
PLATELET # BLD: 283 K/UL (ref 135–450)
PMV BLD AUTO: 8.3 FL (ref 5–10.5)
POTASSIUM REFLEX MAGNESIUM: 3.7 MMOL/L (ref 3.5–5.1)
RBC # BLD: 4.43 M/UL (ref 4–5.2)
SODIUM BLD-SCNC: 139 MMOL/L (ref 136–145)
TOTAL PROTEIN: 6.9 G/DL (ref 6.4–8.2)
WBC # BLD: 5 K/UL (ref 4–11)

## 2021-08-29 PROCEDURE — 6370000000 HC RX 637 (ALT 250 FOR IP): Performed by: PHYSICIAN ASSISTANT

## 2021-08-29 PROCEDURE — 80053 COMPREHEN METABOLIC PANEL: CPT

## 2021-08-29 PROCEDURE — 1200000000 HC SEMI PRIVATE

## 2021-08-29 PROCEDURE — 6360000002 HC RX W HCPCS: Performed by: INTERNAL MEDICINE

## 2021-08-29 PROCEDURE — 6370000000 HC RX 637 (ALT 250 FOR IP): Performed by: INTERNAL MEDICINE

## 2021-08-29 PROCEDURE — 2580000003 HC RX 258: Performed by: INTERNAL MEDICINE

## 2021-08-29 PROCEDURE — 2500000003 HC RX 250 WO HCPCS: Performed by: NURSE PRACTITIONER

## 2021-08-29 PROCEDURE — 86140 C-REACTIVE PROTEIN: CPT

## 2021-08-29 PROCEDURE — 2580000003 HC RX 258: Performed by: NURSE PRACTITIONER

## 2021-08-29 PROCEDURE — 36415 COLL VENOUS BLD VENIPUNCTURE: CPT

## 2021-08-29 PROCEDURE — 94760 N-INVAS EAR/PLS OXIMETRY 1: CPT

## 2021-08-29 PROCEDURE — 85025 COMPLETE CBC W/AUTO DIFF WBC: CPT

## 2021-08-29 PROCEDURE — 6360000002 HC RX W HCPCS: Performed by: NURSE PRACTITIONER

## 2021-08-29 PROCEDURE — 6370000000 HC RX 637 (ALT 250 FOR IP): Performed by: NURSE PRACTITIONER

## 2021-08-29 PROCEDURE — 85379 FIBRIN DEGRADATION QUANT: CPT

## 2021-08-29 RX ADMIN — Medication 10 ML: at 21:50

## 2021-08-29 RX ADMIN — REMDESIVIR 100 MG: 100 INJECTION, POWDER, LYOPHILIZED, FOR SOLUTION INTRAVENOUS at 10:13

## 2021-08-29 RX ADMIN — Medication 2000 UNITS: at 09:54

## 2021-08-29 RX ADMIN — Medication 10 ML: at 09:55

## 2021-08-29 RX ADMIN — FERROUS SULFATE TAB 325 MG (65 MG ELEMENTAL FE) 325 MG: 325 (65 FE) TAB at 09:54

## 2021-08-29 RX ADMIN — THERA TABS 1 TABLET: TAB at 09:54

## 2021-08-29 RX ADMIN — DEXAMETHASONE SODIUM PHOSPHATE 6 MG: 10 INJECTION, SOLUTION INTRAMUSCULAR; INTRAVENOUS at 09:55

## 2021-08-29 RX ADMIN — PANTOPRAZOLE SODIUM 40 MG: 40 TABLET, DELAYED RELEASE ORAL at 06:06

## 2021-08-29 RX ADMIN — ENOXAPARIN SODIUM 30 MG: 30 INJECTION SUBCUTANEOUS at 09:54

## 2021-08-29 RX ADMIN — ENOXAPARIN SODIUM 30 MG: 30 INJECTION SUBCUTANEOUS at 21:50

## 2021-08-29 ASSESSMENT — PAIN SCALES - GENERAL
PAINLEVEL_OUTOF10: 0

## 2021-08-29 NOTE — PLAN OF CARE
Problem: Isolation Precautions - Risk of Spread of Infection  Goal: Prevent transmission of infection  8/29/2021 1244 by Estrella Everett RN  Outcome: Ongoing  8/29/2021 0550 by Ricardo Marmolejo RN  Outcome: Ongoing     Problem: Airway Clearance - Ineffective  Goal: Achieve or maintain patent airway  8/29/2021 1244 by Estrella Everett RN  Outcome: Ongoing  8/29/2021 0550 by Ricardo Marmolejo RN  Outcome: Ongoing     Problem: Gas Exchange - Impaired  Goal: Absence of hypoxia  8/29/2021 1244 by Estrella Everett RN  Outcome: Ongoing  8/29/2021 0550 by Ricardo Marmolejo RN  Outcome: Ongoing  Goal: Promote optimal lung function  8/29/2021 1244 by Estrella Everett RN  Outcome: Ongoing  8/29/2021 0550 by Ricardo Marmolejo RN  Outcome: Ongoing     Problem: Breathing Pattern - Ineffective  Goal: Ability to achieve and maintain a regular respiratory rate  8/29/2021 1244 by Estrella Everett RN  Outcome: Ongoing  8/29/2021 0550 by Ricardo Marmolejo RN  Outcome: Ongoing     Problem:  Body Temperature -  Risk of, Imbalanced  Goal: Ability to maintain a body temperature within defined limits  8/29/2021 1244 by Estrella Everett RN  Outcome: Ongoing  8/29/2021 0550 by Ricardo Marmolejo RN  Outcome: Ongoing  Goal: Will regain or maintain usual level of consciousness  8/29/2021 1244 by Estrella Everett RN  Outcome: Ongoing  8/29/2021 0550 by Ricardo Marmolejo RN  Outcome: Ongoing  Goal: Complications related to the disease process, condition or treatment will be avoided or minimized  8/29/2021 1244 by Estrella Everett RN  Outcome: Ongoing  8/29/2021 0550 by Ricardo Marmolejo RN  Outcome: Ongoing     Problem: Nutrition Deficits  Goal: Optimize nutritional status  8/29/2021 1244 by Estrella Everett RN  Outcome: Ongoing  8/29/2021 0550 by Ricardo Marmolejo RN  Outcome: Ongoing     Problem: Risk for Fluid Volume Deficit  Goal: Maintain normal heart rhythm  8/29/2021 1244 by Estrella Everett RN  Outcome: Ongoing  8/29/2021 0550 by Ricardo Marmolejo RN  Outcome: Ongoing  Goal: Maintain absence of muscle cramping  8/29/2021 1244 by Estrella Everett RN  Outcome: Ongoing  8/29/2021 0550 by Ricardo Marmolejo RN  Outcome: Ongoing  Goal: Maintain normal serum potassium, sodium, calcium, phosphorus, and pH  8/29/2021 1244 by Estrella Everett RN  Outcome: Ongoing  8/29/2021 0550 by Ricardo Marmolejo RN  Outcome: Ongoing     Problem: Loneliness or Risk for Loneliness  Goal: Demonstrate positive use of time alone when socialization is not possible  8/29/2021 1244 by Estrella Everett RN  Outcome: Ongoing  8/29/2021 0550 by Ricardo Marmolejo RN  Outcome: Ongoing     Problem: Fatigue  Goal: Verbalize increase energy and improved vitality  8/29/2021 1244 by Estrella Everett RN  Outcome: Ongoing  8/29/2021 0550 by Ricardo Marmolejo RN  Outcome: Ongoing     Problem: Patient Education: Go to Patient Education Activity  Goal: Patient/Family Education  8/29/2021 1244 by Estrella Everett RN  Outcome: Ongoing  8/29/2021 0550 by Ricardo Marmolejo RN  Outcome: Ongoing     Problem: Nutrition  Goal: Optimal nutrition therapy  8/29/2021 1244 by Estrella Everett RN  Outcome: Ongoing  8/29/2021 0550 by Ricardo Marmolejo RN  Outcome: Ongoing     Problem: Falls - Risk of:  Goal: Will remain free from falls  Description: Will remain free from falls  8/29/2021 1244 by Estrella Everett RN  Outcome: Ongoing  8/29/2021 0550 by Ricardo Marmolejo RN  Outcome: Ongoing  Goal: Absence of physical injury  Description: Absence of physical injury  8/29/2021 1244 by Estrella Everett RN  Outcome: Ongoing  8/29/2021 0550 by Ricardo Marmolejo RN  Outcome: Ongoing

## 2021-08-29 NOTE — PROGRESS NOTES
[facility]    8/29/2021 8:57 AM        Name: Deon Romano . Admitted: 8/26/2021  Primary Care Provider: Lucy Gomez MD (Tel: [phone])     Chief Complaint   Patient presents with    Shortness of Breath     Shortness of breath X2 hours. Covid+, tested last Friday. Brief History: Patient is a 40 yo female with hx JOAQUÍN, HTN. She was diagnosed with COVID-19 about 1 week ago. Presented to hospital with worsening shortness of breath. CXR with evidence of multifocal pneumonia. Documented O2 sat 88% in ER. She was given 1 dose IV decadron. Subjective:  Presently resting in bed, has O2 in place for \"comfort. \" States she does feel \"a little\" better compared to admission but still notes shortness of breath with minimal activity. Home O2 evaluation yesterday showed desaturation to 85% with activity. Advised patient to remain in hospital to complete Remdesevir treatment (day 3 today) since she still requires O2. Patient is going to see if she can arrange for . Her sister is watching her 2 children now but has to go to work tomorrow.      Reviewed interval ancillary notes    Current Medications  sodium chloride flush 0.9 % injection 10 mL, 2 times per day  sodium chloride flush 0.9 % injection 10 mL, PRN  0.9 % sodium chloride infusion, PRN  potassium chloride 10 mEq/100 mL IVPB (Peripheral Line), PRN  magnesium sulfate 2000 mg in 50 mL IVPB premix, PRN  promethazine (PHENERGAN) tablet 12.5 mg, Q6H PRN  acetaminophen (TYLENOL) tablet 650 mg, Q6H PRN   Or  acetaminophen (TYLENOL) suppository 650 mg, Q6H PRN  enoxaparin (LOVENOX) injection 30 mg, BID  guaiFENesin-dextromethorphan (ROBITUSSIN DM) 100-10 MG/5ML syrup 5 mL, Q4H PRN  Vitamin D (CHOLECALCIFEROL) tablet 2,000 Units, Daily  lip balm petroleum free (PHYTOPLEX) stick, PRN  dexamethasone (PF) (DECADRON) injection 6 mg, Daily  remdesivir 100 mg in sodium chloride 0.9 % 250 mL IVPB, Q24H  enalapril (VASOTEC) tablet 5 mg, Daily  ferrous sulfate (IRON 325) tablet 325 mg, Daily with breakfast  multivitamin 1 tablet, Daily  pantoprazole (PROTONIX) tablet 40 mg, QAM AC  calcium carbonate (TUMS) chewable tablet 500 mg, TID PRN  albuterol sulfate  (90 Base) MCG/ACT inhaler 2 puff, Q6H PRN   And  ipratropium (ATROVENT HFA) 17 MCG/ACT inhaler 2 puff, Q6H PRN        Objective:  /69   Pulse 70   Temp 98.7 °F (37.1 °C) (Oral)   Resp 18   Ht 5' 4\" (1.626 m)   Wt 156 lb (70.8 kg)   LMP 08/01/2021 (Exact Date)   SpO2 91%   Breastfeeding No   BMI 26.78 kg/m²   No intake or output data in the 24 hours ending 08/29/21 0857   Wt Readings from Last 3 Encounters:   08/27/21 156 lb (70.8 kg)   07/22/21 162 lb (73.5 kg)   04/11/12 153 lb (69.4 kg)     General:  Awake, alert, oriented in NAD  Skin:  Warm and dry. No unusual bruising or rash  Neck:  Supple. No JVD or carotid bruit appreciated  Chest:  Normal effort.   Diminished, no wheezes/rhonchi/rales  Cardiovascular:  RRR, normal S1/S2, no murmur/gallop/rub  Abdomen:  Soft, nontender, +bowel sounds  Extremities:  No edema  Neurological: No focal deficits  Psychological: Normal mood and affect    Labs and Tests:  CBC:   Recent Labs     08/26/21 1946 08/28/21  0639 08/29/21  0529   WBC 4.5 7.4 5.0   HGB 13.5 12.0 12.0    235 283     BMP:    Recent Labs     08/26/21 1946 08/28/21  0639 08/29/21  0529   * 138 139   K 3.8 3.8 3.7   CL 94* 102 105   CO2 25 24 24   BUN 11 12 11   CREATININE 0.8 0.6 0.6   GLUCOSE 105* 112* 102*     Hepatic:   Recent Labs     08/26/21 1946 08/28/21  0639 08/29/21  0529   AST 58* 37 32   ALT 25 22 21   BILITOT 0.3 0.3 <0.2   ALKPHOS 56 50 48       CXR 8/26/2021:  Patchy infiltrates seen within the mid to lower lungs bilaterally, most   compatible with multifocal pneumonia.  Consider both typical and atypical   etiologies, including viral pneumonia. Problem List  Active Problems:    Pneumonia due to COVID-19 virus  Resolved Problems:    * No resolved hospital problems. *       Assessment & Plan:   1. COVID-19 pneumonia. Patient unvaccinated. Received + COVID test 1 week ago. CXR consistent with multifocal pneumonia, procalcitonin 0.15. O2 sat 88% in ED. Started on IV decadron 6 mg and Remdesivir (day 3). CRP 29->19->13, D-dimer 203-> less than 200. Continue BID enoxaparin. 2. Acute hypoxic respiratory failure. O2 sat documented at 88% in ED. Secondary to multifocal pneumonia. O2 has been weaned, stable mid 90s on room air. Home O2 eval 8/28 showed desaturation to 85% on room air with ambulation. Encouraged to remain in hospital since still requires O2 in order to complete Remdesevir course. She is trying to arrange for . 3. Hyponatremia. Sodium 132 on presentation. Likely secondary to HCTZ use. HCTZ held on admission. Improved, sodium 139 today. 4. HTN. Controlled. Continue enalapril. Monitor BP. 5. GERD. Resume Protonix 40 mg daily. Patient was evaluated today for the diagnosis of COVID-19 pneumonia. I entered a DME order for home oxygen because the diagnosis and testing requires the patient to have supplemental oxygen. Condition will improve or be benefited by oxygen use. The patient is  able to perform good mobility in a home setting and therefore does require the use of a portable oxygen system. The need for this equipment was discussed with the patient and she understands and is in agreement. Diet: ADULT DIET;  Regular  Adult Oral Nutrition Supplement; Standard High Calorie/High Protein Oral Supplement  Code:Full Code  DVT PPX: enoxaparin      Karissa Gum, APRN - CNP   8/29/2021 8:57 AM

## 2021-08-30 LAB
BLOOD CULTURE, ROUTINE: NORMAL
C-REACTIVE PROTEIN: 15.8 MG/L (ref 0–5.1)
D DIMER: <200 NG/ML DDU (ref 0–229)

## 2021-08-30 PROCEDURE — 6370000000 HC RX 637 (ALT 250 FOR IP): Performed by: INTERNAL MEDICINE

## 2021-08-30 PROCEDURE — 36415 COLL VENOUS BLD VENIPUNCTURE: CPT

## 2021-08-30 PROCEDURE — 6370000000 HC RX 637 (ALT 250 FOR IP): Performed by: PHYSICIAN ASSISTANT

## 2021-08-30 PROCEDURE — 1200000000 HC SEMI PRIVATE

## 2021-08-30 PROCEDURE — 6360000002 HC RX W HCPCS: Performed by: INTERNAL MEDICINE

## 2021-08-30 PROCEDURE — 2580000003 HC RX 258: Performed by: NURSE PRACTITIONER

## 2021-08-30 PROCEDURE — 85379 FIBRIN DEGRADATION QUANT: CPT

## 2021-08-30 PROCEDURE — 86140 C-REACTIVE PROTEIN: CPT

## 2021-08-30 PROCEDURE — 6370000000 HC RX 637 (ALT 250 FOR IP): Performed by: NURSE PRACTITIONER

## 2021-08-30 PROCEDURE — 2500000003 HC RX 250 WO HCPCS: Performed by: NURSE PRACTITIONER

## 2021-08-30 PROCEDURE — 6360000002 HC RX W HCPCS: Performed by: NURSE PRACTITIONER

## 2021-08-30 PROCEDURE — 2580000003 HC RX 258: Performed by: INTERNAL MEDICINE

## 2021-08-30 RX ADMIN — THERA TABS 1 TABLET: TAB at 09:31

## 2021-08-30 RX ADMIN — GUAIFENESIN AND DEXTROMETHORPHAN 5 ML: 100; 10 SYRUP ORAL at 09:31

## 2021-08-30 RX ADMIN — PANTOPRAZOLE SODIUM 40 MG: 40 TABLET, DELAYED RELEASE ORAL at 06:21

## 2021-08-30 RX ADMIN — Medication 10 ML: at 20:00

## 2021-08-30 RX ADMIN — Medication 10 ML: at 09:32

## 2021-08-30 RX ADMIN — ENOXAPARIN SODIUM 30 MG: 30 INJECTION SUBCUTANEOUS at 09:32

## 2021-08-30 RX ADMIN — FERROUS SULFATE TAB 325 MG (65 MG ELEMENTAL FE) 325 MG: 325 (65 FE) TAB at 10:42

## 2021-08-30 RX ADMIN — Medication 2000 UNITS: at 09:31

## 2021-08-30 RX ADMIN — ENOXAPARIN SODIUM 30 MG: 30 INJECTION SUBCUTANEOUS at 20:00

## 2021-08-30 RX ADMIN — DEXAMETHASONE SODIUM PHOSPHATE 6 MG: 10 INJECTION, SOLUTION INTRAMUSCULAR; INTRAVENOUS at 09:32

## 2021-08-30 RX ADMIN — REMDESIVIR 100 MG: 100 INJECTION, POWDER, LYOPHILIZED, FOR SOLUTION INTRAVENOUS at 09:36

## 2021-08-30 ASSESSMENT — PAIN SCALES - GENERAL
PAINLEVEL_OUTOF10: 0
PAINLEVEL_OUTOF10: 0

## 2021-08-30 NOTE — PROGRESS NOTES
[facility]    8/30/2021 12:06 PM        Name: Nerissa Martin . Admitted: 8/26/2021  Primary Care Provider: Nick Purdy MD (Tel: [phone])     Chief Complaint   Patient presents with    Shortness of Breath     Shortness of breath X2 hours. Covid+, tested last Friday. Brief History: Patient is a 40 yo female with hx JOAQUÍN, HTN. She was diagnosed with COVID-19 about 1 week ago. Presented to hospital with worsening shortness of breath. CXR with evidence of multifocal pneumonia. Documented O2 sat 88% in ER. She was given 1 dose IV decadron. Subjective:  Presently resting in bed. States she is feeling better, no shortness of breath at rest. Remains fatigued, short of breath with ambulation with desaturations. Cough improving. Temp to 99.7 overnight. Denies nausea, diarrhea. Have recommended to patient she remain in hospital to complete Remdesivir therapy (has completed 4 days) since she still requires O2.      Reviewed interval ancillary notes    Current Medications  sodium chloride flush 0.9 % injection 10 mL, 2 times per day  sodium chloride flush 0.9 % injection 10 mL, PRN  0.9 % sodium chloride infusion, PRN  potassium chloride 10 mEq/100 mL IVPB (Peripheral Line), PRN  magnesium sulfate 2000 mg in 50 mL IVPB premix, PRN  promethazine (PHENERGAN) tablet 12.5 mg, Q6H PRN  acetaminophen (TYLENOL) tablet 650 mg, Q6H PRN   Or  acetaminophen (TYLENOL) suppository 650 mg, Q6H PRN  enoxaparin (LOVENOX) injection 30 mg, BID  guaiFENesin-dextromethorphan (ROBITUSSIN DM) 100-10 MG/5ML syrup 5 mL, Q4H PRN  Vitamin D (CHOLECALCIFEROL) tablet 2,000 Units, Daily  lip balm petroleum free (PHYTOPLEX) stick, PRN  dexamethasone (PF) (DECADRON) injection 6 mg, Daily  remdesivir 100 mg in sodium chloride 0.9 % 250 mL IVPB, Q24H  enalapril (VASOTEC) tablet 5 mg, Daily  ferrous sulfate (IRON 325) tablet consistent with multifocal pneumonia, procalcitonin 0.15. O2 sat 88% in ED. Started on IV decadron 6 mg and Remdesivir (day 4). CRP 29->19->13->15.8, D-dimer 203-> remains < 200. Continue BID enoxaparin. 2. Acute hypoxic respiratory failure. O2 sat documented at 88% in ED. Secondary to multifocal pneumonia. O2 has been weaned, stable mid 90s on room air. Home O2 eval 8/28 showed desaturation to 85% on room air with ambulation. She will need another home O2 eval prior to DC. 3. Hyponatremia. Resolved. Sodium 132 on presentation. Likely secondary to HCTZ use. HCTZ held on admission. 4. HTN. BP has been soft. Enalapril and HCTZ have been held  Monitor BP. 5. GERD. Continue Protonix 40 mg daily. Disposition: Anticipate home with no needs tomorrow if patient agreeable to remain in hospital to complete Remdesivir therapy. Diet: ADULT DIET;  Regular  Adult Oral Nutrition Supplement; Standard High Calorie/High Protein Oral Supplement  Code:Full Code  DVT PPX: enoxaparin      Briseida Del Valle, APRN - CNP   8/30/2021 12:06 PM

## 2021-08-30 NOTE — PROGRESS NOTES
Shift assessment completed. Routine vitals stable. SpO2 96% on 2L O2. Scheduled medications given. Patient is awake, alert and oriented. Respirations are easy and unlabored. Patient does not appear to be in distress. Tele leads replaced. Call light within reach. Pt ambulates independently in room. Pt expresses no further needs at this time.

## 2021-08-31 VITALS
HEIGHT: 64 IN | WEIGHT: 156 LBS | DIASTOLIC BLOOD PRESSURE: 71 MMHG | RESPIRATION RATE: 18 BRPM | TEMPERATURE: 97.9 F | SYSTOLIC BLOOD PRESSURE: 109 MMHG | OXYGEN SATURATION: 94 % | BODY MASS INDEX: 26.63 KG/M2 | HEART RATE: 72 BPM

## 2021-08-31 LAB
ANION GAP SERPL CALCULATED.3IONS-SCNC: 10 MMOL/L (ref 3–16)
BUN BLDV-MCNC: 12 MG/DL (ref 7–20)
CALCIUM SERPL-MCNC: 8.8 MG/DL (ref 8.3–10.6)
CHLORIDE BLD-SCNC: 107 MMOL/L (ref 99–110)
CO2: 23 MMOL/L (ref 21–32)
CREAT SERPL-MCNC: 0.6 MG/DL (ref 0.6–1.1)
CULTURE, BLOOD 2: NORMAL
GFR AFRICAN AMERICAN: >60
GFR NON-AFRICAN AMERICAN: >60
GLUCOSE BLD-MCNC: 117 MG/DL (ref 70–99)
HCT VFR BLD CALC: 36.5 % (ref 36–48)
HEMOGLOBIN: 12 G/DL (ref 12–16)
MCH RBC QN AUTO: 26.8 PG (ref 26–34)
MCHC RBC AUTO-ENTMCNC: 32.8 G/DL (ref 31–36)
MCV RBC AUTO: 81.7 FL (ref 80–100)
PDW BLD-RTO: 13.1 % (ref 12.4–15.4)
PLATELET # BLD: 412 K/UL (ref 135–450)
PMV BLD AUTO: 7.8 FL (ref 5–10.5)
POTASSIUM SERPL-SCNC: 4.1 MMOL/L (ref 3.5–5.1)
RBC # BLD: 4.47 M/UL (ref 4–5.2)
SODIUM BLD-SCNC: 140 MMOL/L (ref 136–145)
WBC # BLD: 6.1 K/UL (ref 4–11)

## 2021-08-31 PROCEDURE — 36415 COLL VENOUS BLD VENIPUNCTURE: CPT

## 2021-08-31 PROCEDURE — 2580000003 HC RX 258: Performed by: NURSE PRACTITIONER

## 2021-08-31 PROCEDURE — 85027 COMPLETE CBC AUTOMATED: CPT

## 2021-08-31 PROCEDURE — 2500000003 HC RX 250 WO HCPCS: Performed by: NURSE PRACTITIONER

## 2021-08-31 PROCEDURE — 80048 BASIC METABOLIC PNL TOTAL CA: CPT

## 2021-08-31 PROCEDURE — 6370000000 HC RX 637 (ALT 250 FOR IP): Performed by: NURSE PRACTITIONER

## 2021-08-31 PROCEDURE — 6360000002 HC RX W HCPCS: Performed by: NURSE PRACTITIONER

## 2021-08-31 PROCEDURE — 6360000002 HC RX W HCPCS: Performed by: INTERNAL MEDICINE

## 2021-08-31 PROCEDURE — 6370000000 HC RX 637 (ALT 250 FOR IP): Performed by: PHYSICIAN ASSISTANT

## 2021-08-31 PROCEDURE — 2580000003 HC RX 258: Performed by: INTERNAL MEDICINE

## 2021-08-31 PROCEDURE — 6370000000 HC RX 637 (ALT 250 FOR IP): Performed by: INTERNAL MEDICINE

## 2021-08-31 RX ORDER — ALBUTEROL SULFATE 90 UG/1
2 AEROSOL, METERED RESPIRATORY (INHALATION) 4 TIMES DAILY
Qty: 1 INHALER | Refills: 1 | Status: SHIPPED | OUTPATIENT
Start: 2021-08-31

## 2021-08-31 RX ORDER — DEXAMETHASONE 4 MG/1
6 TABLET ORAL DAILY
Status: DISCONTINUED | OUTPATIENT
Start: 2021-08-31 | End: 2021-08-31 | Stop reason: HOSPADM

## 2021-08-31 RX ORDER — GUAIFENESIN/DEXTROMETHORPHAN 100-10MG/5
5 SYRUP ORAL EVERY 4 HOURS PRN
Qty: 120 ML | Refills: 0 | Status: SHIPPED | OUTPATIENT
Start: 2021-08-31 | End: 2021-09-10

## 2021-08-31 RX ORDER — DEXAMETHASONE 6 MG/1
6 TABLET ORAL
Qty: 10 TABLET | Refills: 0 | Status: SHIPPED | OUTPATIENT
Start: 2021-08-31 | End: 2021-09-10

## 2021-08-31 RX ORDER — TIOTROPIUM BROMIDE 18 UG/1
18 CAPSULE ORAL; RESPIRATORY (INHALATION) DAILY
Qty: 30 CAPSULE | Refills: 0 | Status: SHIPPED | OUTPATIENT
Start: 2021-08-31 | End: 2021-09-30

## 2021-08-31 RX ORDER — ACETAMINOPHEN 325 MG/1
650 TABLET ORAL EVERY 6 HOURS PRN
COMMUNITY
Start: 2021-08-31

## 2021-08-31 RX ORDER — ENALAPRIL MALEATE 5 MG/1
5 TABLET ORAL DAILY
Qty: 30 TABLET | Refills: 2 | Status: SHIPPED | OUTPATIENT
Start: 2021-08-31

## 2021-08-31 RX ADMIN — GUAIFENESIN AND DEXTROMETHORPHAN 5 ML: 100; 10 SYRUP ORAL at 08:55

## 2021-08-31 RX ADMIN — Medication 10 ML: at 08:55

## 2021-08-31 RX ADMIN — REMDESIVIR 100 MG: 100 INJECTION, POWDER, LYOPHILIZED, FOR SOLUTION INTRAVENOUS at 08:58

## 2021-08-31 RX ADMIN — SODIUM CHLORIDE 25 ML: 9 INJECTION, SOLUTION INTRAVENOUS at 08:58

## 2021-08-31 RX ADMIN — DEXAMETHASONE 6 MG: 4 TABLET ORAL at 08:55

## 2021-08-31 RX ADMIN — THERA TABS 1 TABLET: TAB at 08:55

## 2021-08-31 RX ADMIN — ENOXAPARIN SODIUM 30 MG: 30 INJECTION SUBCUTANEOUS at 08:55

## 2021-08-31 RX ADMIN — FERROUS SULFATE TAB 325 MG (65 MG ELEMENTAL FE) 325 MG: 325 (65 FE) TAB at 08:55

## 2021-08-31 RX ADMIN — PANTOPRAZOLE SODIUM 40 MG: 40 TABLET, DELAYED RELEASE ORAL at 05:41

## 2021-08-31 ASSESSMENT — PAIN SCALES - GENERAL
PAINLEVEL_OUTOF10: 0

## 2021-08-31 NOTE — PROGRESS NOTES
Shift assessment completed. Routine vitals stable. SpO2 95% on room air. Scheduled medications given. Patient is awake, alert and oriented. Respirations are easy and unlabored. Patient does not appear to be in distress. Call light within reach. Pt ambulates independently in room. Pt expresses no further needs at this time.

## 2021-08-31 NOTE — DISCHARGE SUMMARY
[facility] DISCHARGE SUMMARY    Patient Demographics    Patient. Faisal Haro  Date of Birth. 1985  MRN. [id number]     Primary care provider. Shawnee Zhou MD  (Tel: [phone])    Admit date: 8/26/2021    Discharge date (blank if same as Note Date): 8/31/2021  Note Date: 9/6/2021     Reason for Hospitalization. Chief Complaint   Patient presents with    Shortness of Breath     Shortness of breath X2 hours. Covid+, tested last Friday. Significant Findings. Active Problems:    Pneumonia due to COVID-19 virus  Resolved Problems:    * No resolved hospital problems. *       Problems and results from this hospitalization that need follow up. 1. COVID pneumonia. Recommend repeat CXR to ensure clearing. Significant test results and incidental findings. CXR 8/26/2021:  Patchy infiltrates seen within the mid to lower lungs bilaterally, most   compatible with multifocal pneumonia.  Consider both typical and atypical   etiologies, including viral pneumonia.          Invasive procedures and treatments. 1. None     Problem-based Hospital Course. Patient is a 40 yo female with hx JOAQUÍN, HTN. She was diagnosed with COVID-19 about 1 week ago. Presented to hospital with worsening shortness of breath. CXR with evidence of multifocal pneumonia. Documented O2 sat 88% in ER. She was given 1 dose IV decadron. 1. COVID-19 pneumonia. Patient unvaccinated. Received + COVID test 1 week ago. CXR consistent with multifocal pneumonia, procalcitonin 0.15. O2 sat 88% in ED. Started on IV decadron 6 mg daily and transitioned to po to complete 10 day course. She completed 5 day course of Remdesivir. CRP 29->19->13->15.8, D-dimer 203-> remains < 200. Received BID enoxaparin in hospital. Home O2 evaluation performed day of discharge revealed no hypoxia.   O2 sat on room air at rest 98%, O2 sat on room air with exertion 94%. 2. Acute hypoxic respiratory failure. O2 sat documented at 88% in ED. Secondary to multifocal pneumonia. O2 has been weaned, stable mid 90s on room air. Home O2 eval 8/28 showed desaturation to 85% on room air with ambulation, however today Home O2 eval showed saturation 94% on room air with exertion. She agreed to remain in hospital to complete 5 day course remdesivier. 3. Hyponatremia. Resolved. Sodium 132 on presentation. Likely secondary to HCTZ use and this has been discontinued. 4. HTN. BP has been soft (90s-100s). Enalapril and HCTZ held on admission. Reduced dose enalapril subsequently resumed with hold parameters. 5. GERD. Received Protonix 40 mg daily in hospital.     Patient eager for DC home. Reviewed DC plans, follow up and medications with patient. Expresses understanding. Questions answered. Patient stated they felt well and wanted to go home. Patient denied chest pain, shortness of breath, palpitations, abdominal pain, nausea vomiting, diarrhea, dysuria, headache lightheadedness or dizziness. Appetite good. Voiding without difficulty. Reviewed plan of care with patient, verbalized understanding and agreement. Denied further questions or needs. Consults. IP CONSULT TO HOSPITALIST  IP CONSULT TO SOCIAL WORK    Physical examination on discharge day. /71   Pulse 72   Temp 97.9 °F (36.6 °C) (Oral)   Resp 18   Ht 5' 4\" (1.626 m)   Wt 156 lb (70.8 kg)   LMP 08/01/2021 (Exact Date)   SpO2 94%   Breastfeeding No   BMI 26.78 kg/m²   General appearance. Alert. Looks comfortable. HEENT. Sclera clear. Moist mucus membranes. Cardiovascular. Regular rate and rhythm, normal S1, S2. No murmur. Respiratory. Not using accessory muscles. Clear to auscultation bilaterally, no wheeze. Gastrointestinal. Abdomen soft, non-tender, not distended, normal bowel sounds  Neurology. Facial symmetry. No speech deficits.  Moving all extremities equally. Extremities. No edema in lower extremities. Skin. Warm, dry, normal turgor    Condition at time of discharge stable    Medication instructions provided to patient at discharge.      Medication List      START taking these medications    acetaminophen 325 MG tablet  Commonly known as: TYLENOL  Take 2 tablets by mouth every 6 hours as needed for Pain or Fever  Notes to patient: Use: treatment/management of pain or fever  Side effects: stomach upset     albuterol sulfate  (90 Base) MCG/ACT inhaler  Inhale 2 puffs into the lungs 4 times daily  Notes to patient: Use: helps to open airways, reduce secretions  Side effects: nervousness, jitteriness, shakiness, headache, fast heartbeat     dexamethasone 6 MG tablet  Commonly known as: DECADRON  Take 1 tablet by mouth daily (with breakfast) for 10 days  Notes to patient: Use: treatment/management of inflammation, open respiratory airways  Side effects: increased blood sugar, increased appetite, water retention, weight gain, headache     enalapril 5 MG tablet  Commonly known as: VASOTEC  Take 1 tablet by mouth daily  Notes to patient: Use: treatment/management of hypertension, congestive heart failure  Side effects: hypotension, chest pain, dizziness, head ache, syncope     guaiFENesin-dextromethorphan 100-10 MG/5ML syrup  Commonly known as: ROBITUSSIN DM  Take 5 mLs by mouth every 4 hours as needed for Cough  Notes to patient: Use: treatment/management of cough  Side effects: dizziness, drowsiness, nausea, vomiting     Spiriva HandiHaler 18 MCG inhalation capsule  Generic drug: tiotropium  Inhale 1 capsule into the lungs daily  Notes to patient: Use: prevention/treatment of bronchospasms  Side effects: constipation, dry mouth, stomach upset, irregular heart beat        CONTINUE taking these medications    docusate sodium 100 MG capsule  Commonly known as: Colace  Take 1 capsule by mouth daily as needed for Constipation     ferrous sulfate 325 (65 Fe) MG tablet  Commonly known as: IRON 325     Prenatal Vitamin 27-0.8 MG Tabs     vitamin D 1000 UNIT Tabs tablet  Commonly known as: CHOLECALCIFEROL  Take 1 tablet by mouth daily     ZANTAC PO        STOP taking these medications    enalapril-hydroCHLOROthiazide 10-25 MG per tablet  Commonly known as: VASERETIC     ibuprofen 200 MG tablet  Commonly known as: ADVIL;MOTRIN     ibuprofen 800 MG tablet  Commonly known as: ADVIL;MOTRIN           Where to Get Your Medications      These medications were sent to [facility] [address], [address] [phone] Bobby Curran [phone] [address] [facility], [address] [facility] [address]    Phone: [phone]   · albuterol sulfate  (90 Base) MCG/ACT inhaler  · dexamethasone 6 MG tablet  · enalapril 5 MG tablet  · guaiFENesin-dextromethorphan 100-10 MG/5ML syrup  · Spiriva HandiHaler 18 MCG inhalation capsule  · vitamin D 1000 UNIT Tabs tablet     You can get these medications from any pharmacy    You don't need a prescription for these medications  · acetaminophen 325 MG tablet         Discharge recommendations given to patient. Follow up with pcp in 1 week  May return to work on 9/7/21  Disposition. home  Activity. activity as tolerated  Diet: No diet orders on file    Follow up with pcp in 1 week  May return to work on 9/7/21      Spent more than 30 minutes in discharge process. Signed:   CHRISTINE Thornton CNP     9/6/2021 7:06 AM

## 2021-08-31 NOTE — PROGRESS NOTES
Data- discharge order received, pt verbalized agreement to discharge, disposition to previous residence, no needs for HHC/DME. Action- discharge instructions prepared and given to patient, pt verbalized understanding. Medication information packet given r/t NEW and/or CHANGED prescriptions emphasizing name/purpose/side effects, pt verbalized understanding. Discharge instruction summary: Diet- Regular, Activity- Resume as tolerated, Primary Care Physician as follows: Shawnee Zhou [phone] f/u appointment 1-2 weeks, immunizations reviewed and up-to-date, prescription medications filled at pharmacy of choice. Inpatient surgical procedure precautions reviewed: N/A       1. WEIGHT: Admit Weight: 153 lb (69.4 kg) (08/26/21 1918)        Today  Weight: 156 lb (70.8 kg) (08/27/21 0100)       2. O2 SAT.: SpO2: 94 % (08/31/21 0835)    Response- Pt belongings gathered, IV removed. Disposition is home (no HHC/DME needs), transported with belongings, taken to lobby via w/c w/ family, no complications.

## 2021-08-31 NOTE — PROGRESS NOTES
Home Oxygen Evaluation           O2 sat on room air at rest =98%   O2 sat on room air with exertion = 94%

## 2021-08-31 NOTE — PLAN OF CARE
Problem: Isolation Precautions - Risk of Spread of Infection  Goal: Prevent transmission of infection  Outcome: Ongoing  Note: Patient remains in Droplet Plus isolation due to COVID infection. PPE utilized and hand hygiene performed per protocol to prevent transmission of infection. Will continue to monitor. Problem: Gas Exchange - Impaired  Goal: Absence of hypoxia  Outcome: Ongoing  Note: Patient O2 sat 94% on room air at rest.  Will continue to monitor.

## 2021-08-31 NOTE — CARE COORDINATION
JESE spoke to Melissa Manuel with [facility] and he will follow for home oxygen needs. Home oxygen eval pending at this time.      Gonzalez Fox RN, BSN  [phone]

## 2021-09-01 ENCOUNTER — CARE COORDINATION (OUTPATIENT)
Dept: CASE MANAGEMENT | Age: 36
End: 2021-09-01

## 2021-09-01 NOTE — CARE COORDINATION
Patient reports that she is \"doing better\". She denies any fever, SOB, or other COVID associated symptoms. She does report slight cough. Discussed discharge instructions and reviewed medications, she has all of her new medications and is taking them as prescribed. She will call her PCP and schedule follow up. CTN will continue with outreach follow up calls. Patient contacted regarding COVID-19 risk, exposure, diagnosis, pulse oximeter ordered at discharge and monoclonal antibody infusion follow up. Discussed COVID-19 related testing which was available at this time. Test results were positive. Patient informed of results, if available? Yes. Care Transition Nurse contacted the patient by telephone to perform post discharge assessment. Call within 2 business days of discharge: Yes. Verified name and  with patient as identifiers. Provided introduction to self, and explanation of the CTN/ACM role, and reason for call due to risk factors for infection and/or exposure to COVID-19. Symptoms reviewed with patient who verbalized the following symptoms: cough. Due to no new or worsening symptoms encounter was not routed to provider for escalation. Discussed follow-up appointments. If no appointment was previously scheduled, appointment scheduling offered: Yes. [facility] follow up appointment(s): No future appointments. Non-[facility] follow up appointment(s):     Non-face-to-face services provided:  Obtained and reviewed discharge summary and/or continuity of care documents     Advance Care Planning:   Does patient have an Advance Directive:  not on file; education provided. Educated patient about risk for severe COVID-19 due to risk factors according to CDC guidelines. CTN reviewed discharge instructions, medical action plan and red flag symptoms with the patient who verbalized understanding. Discussed COVID vaccination status: No. Education provided on COVID-19 vaccination as appropriate.  Discussed

## 2021-09-14 ENCOUNTER — CARE COORDINATION (OUTPATIENT)
Dept: CASE MANAGEMENT | Age: 36
End: 2021-09-14

## 2021-09-14 NOTE — CARE COORDINATION
[facility] Trans[facility] Follow Up Call    2021    Patient: June Haro  Patient : 1985   MRN: <[id number]>  Reason for Admission: Concern For COVID-19  Discharge Date: 21 RARS: Readmission Risk Score: 12    Spoke with: June Haro who reports that she is ok. Patient reports just really winded with minimal activity and fatigued. Patient reports that she has had follow up with PCP and she will maybe schedule a follow up chest x-ray and enroll her in post Covid rehab group. She states that appetite is coming back and fluid intake is good. Denies any acute needs at present time. Agreeable to f/u. Care Transitions Subsequent and Final Call    Subsequent and Final Calls  Care Transitions Interventions  Other Interventions: Follow Up  No future appointments.     Shlomo Perera LPN

## 2021-09-22 ENCOUNTER — CARE COORDINATION (OUTPATIENT)
Dept: CARE COORDINATION | Age: 36
End: 2021-09-22

## 2021-09-22 NOTE — CARE COORDINATION
[facility] Follow Up Call    2021    Patient: Jessica Haro  Patient : 1985   MRN: [id number]  Reason for Admission: Concern For COVID-19  Discharge Date: 21 RARS: Readmission Risk Score: 12      Spoke with: Jessica Haro     Doing much better, fatigue has diminished. Patient contacted regarding COVID-19 diagnosis. Discussed COVID-19 related testing which was available at this time. Test results were positive. Patient informed of results, if available? Yes    Ambulatory Care Manager contacted the patient by telephone to perform follow-up assessment. Verified name and  with patient as identifiers. Patient has following risk factors of: no known risk factors. Symptoms reviewed with patient who verbalized the following symptoms: no new symptoms and no worsening symptoms. Due to no new or worsening symptoms encounter was not routed to provider for escalation. Educated patient about risk for severe COVID-19 due to risk factors according to CDC guidelines. ACM reviewed discharge instructions, medical action plan and red flag symptoms with the patient who verbalized understanding. Discussed COVID vaccination status: Yes. Education provided on COVID-19 vaccination as appropriate. Discussed exposure protocols and quarantine with CDC Guidelines. Patient was given an opportunity to verbalize any questions and concerns and agrees to contact ACM or health care provider for questions related to their healthcare. Was patient discharged with a pulse oximeter? No Discussed and confirmed pulse oximeter discharge instructions and when to notify provider or seek emergency care. ACM provided contact information. Plan for follow-up call in 5-7 days based on severity of symptoms and risk factors. Pt had PCP appt today in office for f/u.        Care Transitions Subsequent and Final Call    Schedule Follow Up Appointment with PCP: Completed  Subsequent and Final Calls  Do you have any ongoing symptoms?: No  Have your medications changed?: No  Do you have any questions related to your medications?: No  Do you currently have any active services?: No  Do you have any needs or concerns that I can assist you with?: No  Identified Barriers: None  Care Transitions Interventions  No Identified Needs  Other Interventions: Follow Up  No future appointments.     Hari Celaya RN